# Patient Record
Sex: FEMALE | Race: WHITE | NOT HISPANIC OR LATINO | Employment: OTHER | URBAN - METROPOLITAN AREA
[De-identification: names, ages, dates, MRNs, and addresses within clinical notes are randomized per-mention and may not be internally consistent; named-entity substitution may affect disease eponyms.]

---

## 2018-02-08 ENCOUNTER — HOSPITAL ENCOUNTER (OUTPATIENT)
Dept: OTHER | Facility: HOSPITAL | Age: 83
Discharge: HOME | End: 2018-02-08
Attending: OPHTHALMOLOGY | Admitting: OPHTHALMOLOGY

## 2018-02-08 LAB
ANION GAP SERPL CALC-SCNC: 8.9 MMOL/L (ref 10–20)
BUN SERPL-MCNC: 15 MG/DL (ref 8–20)
BUN/CREAT SERPL: 30 (ref 5.4–26.2)
CALCIUM SERPL-MCNC: 8.6 MG/DL (ref 8.9–10.3)
CHLORIDE SERPL-SCNC: 102 MMOL/L (ref 101–111)
CONV CO2: 26 MMOL/L (ref 22–32)
CREAT UR-MCNC: 0.5 MG/DL (ref 0.4–1)
GLUCOSE SERPL-MCNC: 141 MG/DL (ref 65–99)
POTASSIUM SERPL-SCNC: 3.9 MMOL/L (ref 3.6–5.1)
SODIUM SERPL-SCNC: 133 MMOL/L (ref 136–144)

## 2018-02-26 ENCOUNTER — HOSPITAL ENCOUNTER (OUTPATIENT)
Dept: CARDIOLOGY | Facility: HOSPITAL | Age: 83
Discharge: HOME | End: 2018-02-26
Attending: INTERNAL MEDICINE | Admitting: INTERNAL MEDICINE

## 2018-04-17 ENCOUNTER — HOSPITAL ENCOUNTER (OUTPATIENT)
Dept: CARDIOLOGY | Facility: HOSPITAL | Age: 83
Discharge: HOME | End: 2018-04-17
Attending: PODIATRIST | Admitting: PODIATRIST

## 2019-01-04 ENCOUNTER — HOSPITAL ENCOUNTER (OUTPATIENT)
Dept: LAB | Facility: HOSPITAL | Age: 84
Discharge: HOME | End: 2019-01-04
Attending: FAMILY MEDICINE | Admitting: FAMILY MEDICINE

## 2019-01-04 LAB
ALBUMIN SERPL-MCNC: 4 G/DL (ref 3.5–4.8)
ALBUMIN/GLOB SERPL: 1.6 {RATIO} (ref 1–1.7)
ALP SERPL-CCNC: 83 IU/L (ref 32–91)
ALT SERPL-CCNC: 16 IU/L (ref 14–54)
ANION GAP SERPL CALC-SCNC: 12.5 MMOL/L (ref 10–20)
AST SERPL-CCNC: 21 IU/L (ref 15–41)
BASOPHILS # BLD AUTO: 0 10*3/UL (ref 0–0.2)
BASOPHILS NFR BLD AUTO: 1 % (ref 0–2)
BILIRUB SERPL-MCNC: 0.3 MG/DL (ref 0.3–1.2)
BUN SERPL-MCNC: 16 MG/DL (ref 8–20)
BUN/CREAT SERPL: 32 (ref 5.4–26.2)
CALCIUM SERPL-MCNC: 8.7 MG/DL (ref 8.9–10.3)
CHLORIDE SERPL-SCNC: 97 MMOL/L (ref 101–111)
CONV CO2: 26 MMOL/L (ref 22–32)
CONV TOTAL PROTEIN: 6.5 G/DL (ref 6.1–7.9)
CREAT UR-MCNC: 0.5 MG/DL (ref 0.4–1)
DIFFERENTIAL METHOD BLD: (no result)
EOSINOPHIL # BLD AUTO: 0.1 10*3/UL (ref 0–0.3)
EOSINOPHIL # BLD AUTO: 1 % (ref 0–3)
ERYTHROCYTE [DISTWIDTH] IN BLOOD BY AUTOMATED COUNT: 13.4 % (ref 11.5–14.5)
GLOBULIN UR ELPH-MCNC: 2.5 G/DL (ref 2.5–3.8)
GLUCOSE SERPL-MCNC: 105 MG/DL (ref 65–99)
HCT VFR BLD AUTO: 37.5 % (ref 35–49)
HGB BLD-MCNC: 12.6 G/DL (ref 12–15)
LYMPHOCYTES # BLD AUTO: 1.5 10*3/UL (ref 0.8–4.8)
LYMPHOCYTES NFR BLD AUTO: 37 % (ref 18–42)
MCH RBC QN AUTO: 34.4 PG (ref 26–32)
MCHC RBC AUTO-ENTMCNC: 33.5 G/DL (ref 32–36)
MCV RBC AUTO: 102.5 FL (ref 80–94)
MONOCYTES # BLD AUTO: 0.4 10*3/UL (ref 0.1–1.3)
MONOCYTES NFR BLD AUTO: 11 % (ref 2–11)
NEUTROPHILS # BLD AUTO: 2.1 10*3/UL (ref 2.3–8.6)
NEUTROPHILS NFR BLD AUTO: 50 % (ref 50–75)
NRBC BLD AUTO-RTO: 0 /100{WBCS}
NRBC/RBC NFR BLD MANUAL: 0 10*3/UL
PLATELET # BLD AUTO: 210 10*3/UL (ref 150–450)
PMV BLD AUTO: 8.4 FL (ref 7.4–10.4)
POTASSIUM SERPL-SCNC: 4.5 MMOL/L (ref 3.6–5.1)
RBC # BLD AUTO: 3.66 10*6/UL (ref 4–5.4)
SODIUM SERPL-SCNC: 131 MMOL/L (ref 136–144)
WBC # BLD AUTO: 4.2 10*3/UL (ref 4.5–11.5)

## 2019-11-08 ENCOUNTER — HOSPITAL ENCOUNTER (INPATIENT)
Facility: HOSPITAL | Age: 84
LOS: 3 days | Discharge: NURSING, REHAB OR LONG-TERM CARE | End: 2019-11-13
Attending: FAMILY MEDICINE | Admitting: FAMILY MEDICINE

## 2019-11-08 ENCOUNTER — APPOINTMENT (OUTPATIENT)
Dept: CT IMAGING | Facility: HOSPITAL | Age: 84
End: 2019-11-08

## 2019-11-08 ENCOUNTER — APPOINTMENT (OUTPATIENT)
Dept: GENERAL RADIOLOGY | Facility: HOSPITAL | Age: 84
End: 2019-11-08

## 2019-11-08 DIAGNOSIS — R47.81 SLURRED SPEECH: ICD-10-CM

## 2019-11-08 DIAGNOSIS — E87.6 HYPOKALEMIA: ICD-10-CM
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
## 2019-11-08 DIAGNOSIS — R53.1 WEAKNESS: Primary | ICD-10-CM

## 2019-11-08 LAB
ALBUMIN SERPL-MCNC: 4.1 G/DL (ref 3.5–5.2)
ALBUMIN/GLOB SERPL: 1.4 G/DL
ALP SERPL-CCNC: 47 U/L (ref 39–117)
ALT SERPL W P-5'-P-CCNC: 20 U/L (ref 1–33)
ANION GAP SERPL CALCULATED.3IONS-SCNC: 12 MMOL/L (ref 5–15)
AST SERPL-CCNC: 37 U/L (ref 1–32)
BACTERIA UR QL AUTO: ABNORMAL /HPF
BASOPHILS # BLD AUTO: 0 10*3/MM3 (ref 0–0.2)
BASOPHILS NFR BLD AUTO: 0.3 % (ref 0–1.5)
BILIRUB SERPL-MCNC: <0.2 MG/DL (ref 0.2–1.2)
BILIRUB UR QL STRIP: ABNORMAL
BUN BLD-MCNC: 33 MG/DL (ref 8–23)
BUN/CREAT SERPL: 40.7 (ref 7–25)
CALCIUM SPEC-SCNC: 9.2 MG/DL (ref 8.6–10.5)
CHLORIDE SERPL-SCNC: 102 MMOL/L (ref 98–107)
CLARITY UR: CLEAR
CO2 SERPL-SCNC: 26 MMOL/L (ref 22–29)
COLOR UR: ABNORMAL
CREAT BLD-MCNC: 0.81 MG/DL (ref 0.57–1)
DEPRECATED RDW RBC AUTO: 50.8 FL (ref 37–54)
EOSINOPHIL # BLD AUTO: 0.1 10*3/MM3 (ref 0–0.4)
EOSINOPHIL NFR BLD AUTO: 0.7 % (ref 0.3–6.2)
ERYTHROCYTE [DISTWIDTH] IN BLOOD BY AUTOMATED COUNT: 14.1 % (ref 12.3–15.4)
GFR SERPL CREATININE-BSD FRML MDRD: 67 ML/MIN/1.73
GLOBULIN UR ELPH-MCNC: 2.9 GM/DL
GLUCOSE BLD-MCNC: 128 MG/DL (ref 65–99)
GLUCOSE UR STRIP-MCNC: NEGATIVE MG/DL
HCT VFR BLD AUTO: 33.1 % (ref 34–46.6)
HGB BLD-MCNC: 11.5 G/DL (ref 12–15.9)
HGB UR QL STRIP.AUTO: NEGATIVE
HOLD SPECIMEN: NORMAL
HOLD SPECIMEN: NORMAL
HYALINE CASTS UR QL AUTO: ABNORMAL /LPF
KETONES UR QL STRIP: ABNORMAL
LEUKOCYTE ESTERASE UR QL STRIP.AUTO: NEGATIVE
LYMPHOCYTES # BLD AUTO: 0.8 10*3/MM3 (ref 0.7–3.1)
LYMPHOCYTES NFR BLD AUTO: 9.6 % (ref 19.6–45.3)
MCH RBC QN AUTO: 36 PG (ref 26.6–33)
MCHC RBC AUTO-ENTMCNC: 34.8 G/DL (ref 31.5–35.7)
MCV RBC AUTO: 103.5 FL (ref 79–97)
MONOCYTES # BLD AUTO: 0.5 10*3/MM3 (ref 0.1–0.9)
MONOCYTES NFR BLD AUTO: 6.3 % (ref 5–12)
NEUTROPHILS # BLD AUTO: 6.9 10*3/MM3 (ref 1.7–7)
NEUTROPHILS NFR BLD AUTO: 83.1 % (ref 42.7–76)
NITRITE UR QL STRIP: NEGATIVE
NRBC BLD AUTO-RTO: 0.1 /100 WBC (ref 0–0.2)
PH UR STRIP.AUTO: <=5 [PH] (ref 5–8)
PLATELET # BLD AUTO: 192 10*3/MM3 (ref 140–450)
PMV BLD AUTO: 8.5 FL (ref 6–12)
POTASSIUM BLD-SCNC: 3.3 MMOL/L (ref 3.5–5.2)
PROT SERPL-MCNC: 7 G/DL (ref 6–8.5)
PROT UR QL STRIP: ABNORMAL
RBC # BLD AUTO: 3.19 10*6/MM3 (ref 3.77–5.28)
RBC # UR: ABNORMAL /HPF
REF LAB TEST METHOD: ABNORMAL
SODIUM BLD-SCNC: 140 MMOL/L (ref 136–145)
SP GR UR STRIP: 1.03 (ref 1–1.03)
SQUAMOUS #/AREA URNS HPF: ABNORMAL /HPF
UROBILINOGEN UR QL STRIP: ABNORMAL
WBC NRBC COR # BLD: 8.3 10*3/MM3 (ref 3.4–10.8)
WBC UR QL AUTO: ABNORMAL /HPF
WHOLE BLOOD HOLD SPECIMEN: NORMAL
WHOLE BLOOD HOLD SPECIMEN: NORMAL

## 2019-11-08 PROCEDURE — 93005 ELECTROCARDIOGRAM TRACING: CPT

## 2019-11-08 PROCEDURE — 85025 COMPLETE CBC W/AUTO DIFF WBC: CPT | Performed by: NURSE PRACTITIONER

## 2019-11-08 PROCEDURE — P9612 CATHETERIZE FOR URINE SPEC: HCPCS

## 2019-11-08 PROCEDURE — 81001 URINALYSIS AUTO W/SCOPE: CPT | Performed by: NURSE PRACTITIONER

## 2019-11-08 PROCEDURE — 25810000003 SODIUM CHLORIDE 0.9 % WITH KCL 20 MEQ 20-0.9 MEQ/L-% SOLUTION: Performed by: FAMILY MEDICINE

## 2019-11-08 PROCEDURE — 71045 X-RAY EXAM CHEST 1 VIEW: CPT

## 2019-11-08 PROCEDURE — 99284 EMERGENCY DEPT VISIT MOD MDM: CPT

## 2019-11-08 PROCEDURE — G0378 HOSPITAL OBSERVATION PER HR: HCPCS

## 2019-11-08 PROCEDURE — 93005 ELECTROCARDIOGRAM TRACING: CPT | Performed by: FAMILY MEDICINE

## 2019-11-08 PROCEDURE — 70450 CT HEAD/BRAIN W/O DYE: CPT

## 2019-11-08 PROCEDURE — 82962 GLUCOSE BLOOD TEST: CPT

## 2019-11-08 PROCEDURE — 80053 COMPREHEN METABOLIC PANEL: CPT | Performed by: NURSE PRACTITIONER

## 2019-11-08 RX ORDER — SODIUM CHLORIDE AND POTASSIUM CHLORIDE 150; 900 MG/100ML; MG/100ML
75 INJECTION, SOLUTION INTRAVENOUS CONTINUOUS
Status: DISCONTINUED | OUTPATIENT
Start: 2019-11-08 | End: 2019-11-09

## 2019-11-08 RX ORDER — ZONISAMIDE 100 MG/1
100 CAPSULE ORAL 4 TIMES DAILY
COMMUNITY
End: 2022-11-07

## 2019-11-08 RX ORDER — CARBAMAZEPINE 200 MG/1
200 TABLET ORAL 4 TIMES DAILY
COMMUNITY

## 2019-11-08 RX ORDER — NAPROXEN 375 MG/1
375 TABLET ORAL 2 TIMES DAILY WITH MEALS
Status: DISCONTINUED | OUTPATIENT
Start: 2019-11-08 | End: 2019-11-08 | Stop reason: CLARIF

## 2019-11-08 RX ORDER — HYDRALAZINE HYDROCHLORIDE 25 MG/1
100 TABLET, FILM COATED ORAL EVERY 12 HOURS SCHEDULED
Status: DISCONTINUED | OUTPATIENT
Start: 2019-11-08 | End: 2019-11-13 | Stop reason: HOSPADM

## 2019-11-08 RX ORDER — SODIUM CHLORIDE 0.9 % (FLUSH) 0.9 %
10 SYRINGE (ML) INJECTION EVERY 12 HOURS SCHEDULED
Status: DISCONTINUED | OUTPATIENT
Start: 2019-11-08 | End: 2019-11-13 | Stop reason: HOSPADM

## 2019-11-08 RX ORDER — PANTOPRAZOLE SODIUM 40 MG/1
40 TABLET, DELAYED RELEASE ORAL EVERY MORNING
Status: DISCONTINUED | OUTPATIENT
Start: 2019-11-09 | End: 2019-11-13 | Stop reason: HOSPADM

## 2019-11-08 RX ORDER — HYDRALAZINE HYDROCHLORIDE 100 MG/1
100 TABLET, FILM COATED ORAL 2 TIMES DAILY
COMMUNITY

## 2019-11-08 RX ORDER — GABAPENTIN 300 MG/1
300 CAPSULE ORAL 3 TIMES DAILY
Status: ON HOLD | COMMUNITY
End: 2019-11-12 | Stop reason: SDUPTHER

## 2019-11-08 RX ORDER — POTASSIUM CHLORIDE 20 MEQ/1
20 TABLET, EXTENDED RELEASE ORAL DAILY
Status: DISCONTINUED | OUTPATIENT
Start: 2019-11-08 | End: 2019-11-13 | Stop reason: HOSPADM

## 2019-11-08 RX ORDER — SODIUM CHLORIDE 0.9 % (FLUSH) 0.9 %
10 SYRINGE (ML) INJECTION AS NEEDED
Status: DISCONTINUED | OUTPATIENT
Start: 2019-11-08 | End: 2019-11-13 | Stop reason: HOSPADM

## 2019-11-08 RX ORDER — CARBAMAZEPINE 200 MG/1
200 TABLET ORAL EVERY 6 HOURS SCHEDULED
Status: DISCONTINUED | OUTPATIENT
Start: 2019-11-09 | End: 2019-11-12

## 2019-11-08 RX ORDER — ZONISAMIDE 100 MG/1
100 CAPSULE ORAL EVERY 6 HOURS SCHEDULED
Status: DISCONTINUED | OUTPATIENT
Start: 2019-11-09 | End: 2019-11-12

## 2019-11-08 RX ORDER — OMEPRAZOLE 20 MG/1
20 CAPSULE, DELAYED RELEASE ORAL DAILY
COMMUNITY

## 2019-11-08 RX ORDER — DIMENHYDRINATE 50 MG
1 TABLET ORAL DAILY
COMMUNITY
End: 2019-11-13 | Stop reason: HOSPADM

## 2019-11-08 RX ORDER — IBUPROFEN 400 MG/1
800 TABLET ORAL EVERY 8 HOURS SCHEDULED
Status: DISCONTINUED | OUTPATIENT
Start: 2019-11-08 | End: 2019-11-13 | Stop reason: HOSPADM

## 2019-11-08 RX ORDER — GABAPENTIN 300 MG/1
300 CAPSULE ORAL EVERY 8 HOURS SCHEDULED
Status: DISCONTINUED | OUTPATIENT
Start: 2019-11-08 | End: 2019-11-09

## 2019-11-08 RX ADMIN — POTASSIUM CHLORIDE 20 MEQ: 1500 TABLET, EXTENDED RELEASE ORAL at 17:42

## 2019-11-08 RX ADMIN — HYDRALAZINE HYDROCHLORIDE 100 MG: 25 TABLET, FILM COATED ORAL at 22:09

## 2019-11-08 RX ADMIN — Medication 10 ML: at 16:29

## 2019-11-08 RX ADMIN — IBUPROFEN 800 MG: 400 TABLET ORAL at 22:13

## 2019-11-08 RX ADMIN — GABAPENTIN 300 MG: 300 CAPSULE ORAL at 22:09

## 2019-11-08 RX ADMIN — SODIUM CHLORIDE AND POTASSIUM CHLORIDE 75 ML/HR: 9; 1.49 INJECTION, SOLUTION INTRAVENOUS at 22:11

## 2019-11-08 NOTE — ED PROVIDER NOTES
Subjective   87-year-old female presents with a one-week history of intermittent slurred speech, weakness of the left lower extremity.  Family at bedside reports that she lives alone and that they check in on her daily.  When patient had initial episode 1 week ago she followed up with her PCP, Dr. Yessy Spears.  Her son reported that she was found on the floor but denies any injury.  Patient is alert to self and family at bedside, not time.    1. Location: Denies pain  2. Quality: None  3. Severity: 0  4. Worsening factors: Denies  5. Alleviating factors: Denies  6. Onset: 1 week  7. Radiation: None  8. Frequency: Intermittent  9. Co-morbidities: Dementia, GERD, hypertension, and immobility disorder  10. Source: Patient and family at bedside              Review of Systems   Constitutional: Negative for chills, diaphoresis and fever.   HENT: Negative for ear discharge and rhinorrhea.    Eyes: Negative for photophobia, pain and visual disturbance.   Respiratory: Negative for chest tightness and shortness of breath.    Cardiovascular: Negative for chest pain, palpitations and leg swelling.   Gastrointestinal: Negative for abdominal pain, nausea and vomiting.   Genitourinary: Negative for difficulty urinating and dysuria.   Musculoskeletal: Negative for gait problem.   Skin: Negative for color change, pallor and rash.   Neurological: Positive for weakness. Negative for dizziness, speech difficulty, numbness and headaches.   Psychiatric/Behavioral: Positive for confusion. Negative for agitation.   All other systems reviewed and are negative.      No past medical history on file.    Allergies   Allergen Reactions   • Banana Itching       No past surgical history on file.    No family history on file.    Social History     Socioeconomic History   • Marital status:      Spouse name: Not on file   • Number of children: Not on file   • Years of education: Not on file   • Highest education level: Not on file            Objective   Physical Exam   Constitutional: She is oriented to person, place, and time. She appears well-developed and well-nourished. No distress.   HENT:   Head: Normocephalic and atraumatic.   Right Ear: External ear normal.   Left Ear: External ear normal.   Nose: Nose normal.   Mouth/Throat: Oropharynx is clear and moist.   Eyes: Conjunctivae and EOM are normal. Pupils are equal, round, and reactive to light.   Neck: Trachea normal, normal range of motion, full passive range of motion without pain and phonation normal. Neck supple. No spinous process tenderness present. No neck rigidity. No erythema and normal range of motion present. No Brudzinski's sign and no Kernig's sign noted.   Cardiovascular: Normal rate, regular rhythm, S1 normal, S2 normal, normal heart sounds, intact distal pulses and normal pulses. Exam reveals no gallop and no friction rub.   No murmur heard.  Pulmonary/Chest: Effort normal and breath sounds normal. No stridor. No respiratory distress. She has no wheezes. She has no rales. She exhibits no tenderness.   Abdominal: Soft. Bowel sounds are normal. She exhibits no distension and no mass. There is no tenderness. There is no rebound and no guarding. No hernia.   Musculoskeletal: Normal range of motion.   Neurological: She is alert and oriented to person, place, and time. She has normal strength. No cranial nerve deficit or sensory deficit. She exhibits normal muscle tone. GCS eye subscore is 4. GCS verbal subscore is 5. GCS motor subscore is 6.   Nothing focal noted on exam.   Skin: Skin is warm and dry. Capillary refill takes less than 2 seconds.   Psychiatric: She has a normal mood and affect. Her behavior is normal. Judgment and thought content normal.   Nursing note and vitals reviewed.      Procedures           ED Course  ED Course as of Nov 08 1738 Fri Nov 08, 2019   1636 Awaiting labs and CT result.  [AL]   1707 Noted to be 12.5 10 months ago. Hemoglobin: (!) 11.5 [AL]       ED Course User Index  [AL] Ariana Baires, NP       Ct Head Without Contrast    Result Date: 11/8/2019   1. No acute intracranial finding. 2. FINDINGS consistent with mild chronic microvascular disease and atrophy.  Electronically Signed By-Dr. Zayra Mena MD On:11/8/2019 4:40 PM This report was finalized on [id number] by Dr. Zayra Mena MD.    Xr Chest 1 View    Result Date: 11/8/2019  1. Cardiomegaly with mild bilateral perihilar airspace disease which may relate to pulmonary edema or pneumonia. 2. Small left pleural effusion.  Electronically Signed By-Rolly Lin On:11/8/2019 4:24 PM This report was finalized on [id number] by  Rolly Lin, .    Medications   sodium chloride 0.9 % flush 10 mL (10 mL Intravenous Given 11/8/19 1629)   potassium chloride (K-DUR,KLOR-CON) CR tablet 20 mEq (not administered)     Labs Reviewed   COMPREHENSIVE METABOLIC PANEL - Abnormal; Notable for the following components:       Result Value    Glucose 128 (*)     BUN 33 (*)     Potassium 3.3 (*)     AST (SGOT) 37 (*)     Total Bilirubin <0.2 (*)     BUN/Creatinine Ratio 40.7 (*)     All other components within normal limits    Narrative:     GFR Normal >60  Chronic Kidney Disease <60  Kidney Failure <15   URINALYSIS W/ MICROSCOPIC IF INDICATED (NO CULTURE) - Abnormal; Notable for the following components:    Color, UA Dark Yellow (*)     Ketones, UA Trace (*)     Bilirubin, UA Small (1+) (*)     Protein, UA 30 mg/dL (1+) (*)     All other components within normal limits   CBC WITH AUTO DIFFERENTIAL - Abnormal; Notable for the following components:    RBC 3.19 (*)     Hemoglobin 11.5 (*)     Hematocrit 33.1 (*)     .5 (*)     MCH 36.0 (*)     Neutrophil % 83.1 (*)     Lymphocyte % 9.6 (*)     All other components within normal limits   URINALYSIS, MICROSCOPIC ONLY - Abnormal; Notable for the following components:    Bacteria, UA Trace (*)     All other components within normal limits   RAINBOW DRAW     Narrative:     The following orders were created for panel order [place] Draw.  Procedure                               Abnormality         Status                     ---------                               -----------         ------                     Light Blue Top[[id number]]                                   Final result               Green Top (Gel)[[id number]]                                  Final result               Lavender Top[[id number]]                                     Final result               Gold Top - SST[[id number]]                                   Final result                 Please view results for these tests on the individual orders.   POCT GLUCOSE FINGERSTICK   CBC AND DIFFERENTIAL    Narrative:     The following orders were created for panel order CBC & Differential.  Procedure                               Abnormality         Status                     ---------                               -----------         ------                     CBC Auto Differential[[id number]]        Abnormal            Final result                 Please view results for these tests on the individual orders.   LIGHT BLUE TOP   GREEN TOP   LAVENDER TOP   GOLD TOP - SST                 MDM  Number of Diagnoses or Management Options  Fall, initial encounter:   Hypokalemia:   Slurred speech:   Weakness:   Diagnosis management comments: Chart Review: Nothing for comparison.  Comorbidity: Dementia, GERD, hypertension, and immobility disorder  Imaging: Was interpreted by physician and reviewed by myself: Xr Chest 1 View    Result Date: 11/8/2019  1. Cardiomegaly with mild bilateral perihilar airspace disease which may relate to pulmonary edema or pneumonia. 2. Small left pleural effusion.  Electronically Signed By-Rolly Lin On:11/8/2019 4:24 PM This report was finalized on [id number] by  Rolly Lin, .  Disposition/Treatment: Discussed results with patient, verbalized understanding.  Agreeable with plan of  care.    Patient undressed and placed in gown for exam. 87-year-old female presents with a one-week history of intermittent slurred speech, weakness of the left lower extremity.  Family at bedside reports that she lives alone and that they check in on her daily.  When patient had initial episode 1 week ago she followed up with her PCP, Dr. Yessy Spears.  Her son reported that she was found on the floor but denies any injury 10/26/19.  Patient is alert to self and family at bedside.  Chest x-ray and head CT both negative.  Paged Dr. Spears who accepted admission.         Amount and/or Complexity of Data Reviewed  Clinical lab tests: reviewed  Tests in the radiology section of CPT®: reviewed  Tests in the medicine section of CPT®: reviewed        Final diagnoses:   Weakness   Slurred speech   Fall, initial encounter   Hypokalemia              Ariana Baires, NP  11/08/19 9774

## 2019-11-08 NOTE — ED NOTES
Dtr's at bedside state pt lives alone but family checks on her everyday. They state she has been lethargic, weak, uncoordinated, and slurring her speech since about October 26th or 27th. Pt son found pt on the floor on that day as well, stating she did not hit her head or have LOC that day. She was seen at doctor on October 29th and s/sx were addressed with PCP Dr. eYssy Spears. They ordered blood work & urine and was told everything except her elevated cholesterol looked normal. S/sx improved for a couple days and then today her slurred speech, uncoordinated movements, and weakness with c/o left leg unable to move today only. Dtr states pt stated she went down to the floor so she didn't fall and hurt herself yesterday, and did have an episode of vomiting yesterday. Pt is A&O to self and place only. Disoriented to time and situation, with a GCS of 13. Pt denies any pain.     Kristina Rocha RN  11/08/19 5452

## 2019-11-09 ENCOUNTER — APPOINTMENT (OUTPATIENT)
Dept: MRI IMAGING | Facility: HOSPITAL | Age: 84
End: 2019-11-09

## 2019-11-09 ENCOUNTER — HOSPITAL ENCOUNTER (OUTPATIENT)
Dept: CARDIOLOGY | Facility: HOSPITAL | Age: 84
Setting detail: OBSERVATION
Discharge: HOME | End: 2019-11-09

## 2019-11-09 VITALS
BODY MASS INDEX: 22.58 KG/M2 | WEIGHT: 115 LBS | DIASTOLIC BLOOD PRESSURE: 44 MMHG | HEIGHT: 60 IN | SYSTOLIC BLOOD PRESSURE: 115 MMHG

## 2019-11-09 LAB
ALBUMIN SERPL-MCNC: 3.4 G/DL (ref 3.5–5.2)
ALBUMIN/GLOB SERPL: 1.4 G/DL
ALP SERPL-CCNC: 43 U/L (ref 39–117)
ALT SERPL W P-5'-P-CCNC: 15 U/L (ref 1–33)
ANION GAP SERPL CALCULATED.3IONS-SCNC: 12 MMOL/L (ref 5–15)
AST SERPL-CCNC: 29 U/L (ref 1–32)
BASOPHILS # BLD AUTO: 0 10*3/MM3 (ref 0–0.2)
BASOPHILS NFR BLD AUTO: 0.5 % (ref 0–1.5)
BILIRUB SERPL-MCNC: 0.2 MG/DL (ref 0.2–1.2)
BUN BLD-MCNC: 28 MG/DL (ref 8–23)
BUN/CREAT SERPL: 39.4 (ref 7–25)
CALCIUM SPEC-SCNC: 8.4 MG/DL (ref 8.6–10.5)
CHLORIDE SERPL-SCNC: 105 MMOL/L (ref 98–107)
CHOLEST SERPL-MCNC: 300 MG/DL (ref 0–200)
CO2 SERPL-SCNC: 24 MMOL/L (ref 22–29)
CREAT BLD-MCNC: 0.71 MG/DL (ref 0.57–1)
DEPRECATED RDW RBC AUTO: 51.2 FL (ref 37–54)
EOSINOPHIL # BLD AUTO: 0.2 10*3/MM3 (ref 0–0.4)
EOSINOPHIL NFR BLD AUTO: 2.7 % (ref 0.3–6.2)
ERYTHROCYTE [DISTWIDTH] IN BLOOD BY AUTOMATED COUNT: 14 % (ref 12.3–15.4)
GFR SERPL CREATININE-BSD FRML MDRD: 78 ML/MIN/1.73
GLOBULIN UR ELPH-MCNC: 2.5 GM/DL
GLUCOSE BLD-MCNC: 90 MG/DL (ref 65–99)
HCT VFR BLD AUTO: 28.6 % (ref 34–46.6)
HDLC SERPL-MCNC: 71 MG/DL (ref 40–60)
HEMOCCULT STL QL IA: NEGATIVE
HGB BLD-MCNC: 9.7 G/DL (ref 12–15.9)
LDLC SERPL CALC-MCNC: 199 MG/DL (ref 0–100)
LDLC/HDLC SERPL: 2.81 {RATIO}
LYMPHOCYTES # BLD AUTO: 1.1 10*3/MM3 (ref 0.7–3.1)
LYMPHOCYTES NFR BLD AUTO: 18.4 % (ref 19.6–45.3)
MCH RBC QN AUTO: 35.3 PG (ref 26.6–33)
MCHC RBC AUTO-ENTMCNC: 34 G/DL (ref 31.5–35.7)
MCV RBC AUTO: 103.9 FL (ref 79–97)
MONOCYTES # BLD AUTO: 0.6 10*3/MM3 (ref 0.1–0.9)
MONOCYTES NFR BLD AUTO: 10.1 % (ref 5–12)
NEUTROPHILS # BLD AUTO: 4.2 10*3/MM3 (ref 1.7–7)
NEUTROPHILS NFR BLD AUTO: 68.3 % (ref 42.7–76)
NRBC BLD AUTO-RTO: 0 /100 WBC (ref 0–0.2)
PLATELET # BLD AUTO: 160 10*3/MM3 (ref 140–450)
PMV BLD AUTO: 8.5 FL (ref 6–12)
POTASSIUM BLD-SCNC: 3.5 MMOL/L (ref 3.5–5.2)
PROT SERPL-MCNC: 5.9 G/DL (ref 6–8.5)
RBC # BLD AUTO: 2.75 10*6/MM3 (ref 3.77–5.28)
SODIUM BLD-SCNC: 141 MMOL/L (ref 136–145)
TRIGL SERPL-MCNC: 148 MG/DL (ref 0–150)
TSH SERPL DL<=0.05 MIU/L-ACNC: 1.09 UIU/ML (ref 0.27–4.2)
VIT B12 BLD-MCNC: 286 PG/ML (ref 211–946)
VLDLC SERPL-MCNC: 29.6 MG/DL
WBC NRBC COR # BLD: 6.2 10*3/MM3 (ref 3.4–10.8)

## 2019-11-09 PROCEDURE — 25810000003 SODIUM CHLORIDE 0.9 % WITH KCL 20 MEQ 20-0.9 MEQ/L-% SOLUTION: Performed by: FAMILY MEDICINE

## 2019-11-09 PROCEDURE — 97116 GAIT TRAINING THERAPY: CPT

## 2019-11-09 PROCEDURE — 97166 OT EVAL MOD COMPLEX 45 MIN: CPT

## 2019-11-09 PROCEDURE — 70549 MR ANGIOGRAPH NECK W/O&W/DYE: CPT

## 2019-11-09 PROCEDURE — 80157 ASSAY CARBAMAZEPINE FREE: CPT | Performed by: NURSE PRACTITIONER

## 2019-11-09 PROCEDURE — 83036 HEMOGLOBIN GLYCOSYLATED A1C: CPT | Performed by: FAMILY MEDICINE

## 2019-11-09 PROCEDURE — 70544 MR ANGIOGRAPHY HEAD W/O DYE: CPT

## 2019-11-09 PROCEDURE — 25010000002 GADOTERIDOL PER 1 ML: Performed by: FAMILY MEDICINE

## 2019-11-09 PROCEDURE — 80053 COMPREHEN METABOLIC PANEL: CPT | Performed by: FAMILY MEDICINE

## 2019-11-09 PROCEDURE — 84443 ASSAY THYROID STIM HORMONE: CPT | Performed by: FAMILY MEDICINE

## 2019-11-09 PROCEDURE — 80203 DRUG SCREEN QUANT ZONISAMIDE: CPT | Performed by: NURSE PRACTITIONER

## 2019-11-09 PROCEDURE — 97163 PT EVAL HIGH COMPLEX 45 MIN: CPT

## 2019-11-09 PROCEDURE — G0378 HOSPITAL OBSERVATION PER HR: HCPCS

## 2019-11-09 PROCEDURE — 80061 LIPID PANEL: CPT | Performed by: FAMILY MEDICINE

## 2019-11-09 PROCEDURE — 85025 COMPLETE CBC W/AUTO DIFF WBC: CPT | Performed by: FAMILY MEDICINE

## 2019-11-09 PROCEDURE — 82274 ASSAY TEST FOR BLOOD FECAL: CPT | Performed by: FAMILY MEDICINE

## 2019-11-09 PROCEDURE — 25010000002 CEFTRIAXONE PER 250 MG: Performed by: NURSE PRACTITIONER

## 2019-11-09 PROCEDURE — 80156 ASSAY CARBAMAZEPINE TOTAL: CPT | Performed by: NURSE PRACTITIONER

## 2019-11-09 PROCEDURE — 93306 TTE W/DOPPLER COMPLETE: CPT

## 2019-11-09 PROCEDURE — 82607 VITAMIN B-12: CPT | Performed by: FAMILY MEDICINE

## 2019-11-09 PROCEDURE — 70551 MRI BRAIN STEM W/O DYE: CPT

## 2019-11-09 PROCEDURE — A9576 INJ PROHANCE MULTIPACK: HCPCS | Performed by: FAMILY MEDICINE

## 2019-11-09 RX ORDER — GABAPENTIN 100 MG/1
100 CAPSULE ORAL EVERY 8 HOURS SCHEDULED
Status: DISCONTINUED | OUTPATIENT
Start: 2019-11-09 | End: 2019-11-11

## 2019-11-09 RX ORDER — POTASSIUM CHLORIDE 20 MEQ/1
20 TABLET, EXTENDED RELEASE ORAL DAILY
Status: DISCONTINUED | OUTPATIENT
Start: 2019-11-09 | End: 2019-11-09 | Stop reason: SDUPTHER

## 2019-11-09 RX ORDER — ATORVASTATIN CALCIUM 20 MG/1
20 TABLET, FILM COATED ORAL NIGHTLY
Status: DISCONTINUED | OUTPATIENT
Start: 2019-11-09 | End: 2019-11-13 | Stop reason: HOSPADM

## 2019-11-09 RX ADMIN — HYDRALAZINE HYDROCHLORIDE 100 MG: 25 TABLET, FILM COATED ORAL at 11:09

## 2019-11-09 RX ADMIN — Medication 10 ML: at 21:21

## 2019-11-09 RX ADMIN — GABAPENTIN 300 MG: 300 CAPSULE ORAL at 06:31

## 2019-11-09 RX ADMIN — ZONISAMIDE 100 MG: 100 CAPSULE ORAL at 17:17

## 2019-11-09 RX ADMIN — CARBAMAZEPINE 200 MG: 200 TABLET ORAL at 17:17

## 2019-11-09 RX ADMIN — PANTOPRAZOLE SODIUM 40 MG: 40 TABLET, DELAYED RELEASE ORAL at 06:31

## 2019-11-09 RX ADMIN — IBUPROFEN 800 MG: 400 TABLET ORAL at 06:31

## 2019-11-09 RX ADMIN — IBUPROFEN 800 MG: 400 TABLET ORAL at 21:20

## 2019-11-09 RX ADMIN — ATORVASTATIN CALCIUM 20 MG: 20 TABLET, FILM COATED ORAL at 21:20

## 2019-11-09 RX ADMIN — CEFTRIAXONE SODIUM 1 G: 1 INJECTION, POWDER, FOR SOLUTION INTRAMUSCULAR; INTRAVENOUS at 16:39

## 2019-11-09 RX ADMIN — HYDRALAZINE HYDROCHLORIDE 100 MG: 25 TABLET, FILM COATED ORAL at 21:20

## 2019-11-09 RX ADMIN — IBUPROFEN 800 MG: 400 TABLET ORAL at 13:37

## 2019-11-09 RX ADMIN — GADOTERIDOL 20 ML: 279.3 INJECTION, SOLUTION INTRAVENOUS at 12:30

## 2019-11-09 RX ADMIN — GABAPENTIN 100 MG: 100 CAPSULE ORAL at 13:36

## 2019-11-09 RX ADMIN — CARBAMAZEPINE 200 MG: 200 TABLET ORAL at 00:09

## 2019-11-09 RX ADMIN — CARBAMAZEPINE 200 MG: 200 TABLET ORAL at 11:09

## 2019-11-09 RX ADMIN — GABAPENTIN 100 MG: 100 CAPSULE ORAL at 21:20

## 2019-11-09 RX ADMIN — CARBAMAZEPINE 200 MG: 200 TABLET ORAL at 06:31

## 2019-11-09 RX ADMIN — POTASSIUM CHLORIDE 20 MEQ: 1500 TABLET, EXTENDED RELEASE ORAL at 11:09

## 2019-11-09 RX ADMIN — Medication 10 ML: at 11:10

## 2019-11-09 RX ADMIN — Medication 10 ML: at 00:11

## 2019-11-09 RX ADMIN — SODIUM CHLORIDE AND POTASSIUM CHLORIDE 75 ML/HR: 9; 1.49 INJECTION, SOLUTION INTRAVENOUS at 13:25

## 2019-11-09 NOTE — PLAN OF CARE
Problem: Patient Care Overview  Goal: Plan of Care Review   11/09/19 2345   Coping/Psychosocial   Plan of Care Reviewed With patient;daughter   OTHER   Outcome Summary 87 y.o female adm with weakness and recent fall. Of note, pt has L foot drop and does have L AFO in hospital. Pt has been confused, with episode of slurred speech. Per dtr, pt has  previous episodes similiar to this, but then returns to normal function. Pt resides alone and was previously independent up until recently, but fam very involved and able to provide near 24 hr supervision. Pt not currently at high baseline function, and would benefit from an IP Rehab stay to address deficits.

## 2019-11-09 NOTE — THERAPY EVALUATION
Acute Care - Occupational Therapy Initial Evaluation   [facility]     Patient Name: Julianna Myles  : 10/7/1932  MRN: [id number]  Today's Date: 2019             Admit Date: 2019       ICD-10-CM ICD-9-CM   1. Weakness R53.1 780.79   2. Slurred speech R47.81 784.59   3. Fall, initial encounter W19.XXXA E888.9   4. Hypokalemia E87.6 276.8     Patient Active Problem List   Diagnosis   • Weakness     Past Medical History:   Diagnosis Date   • Arthritis    • Elevated cholesterol    • GERD (gastroesophageal reflux disease)      Past Surgical History:   Procedure Laterality Date   • BACK SURGERY            OT ASSESSMENT FLOWSHEET (last 12 hours)      Occupational Therapy Evaluation     Row Name 19 1044                   OT Evaluation Time/Intention    Document Type  evaluation  -KARI        Mode of Treatment  occupational therapy  -KARI           General Information    Patient Profile Reviewed?  yes  -KARI        Prior Level of Function  mod assist:;all household mobility;ADL's;home management recently needing more assist of fam d/t weakness, falls  -KARI        Equipment Currently Used at Home  walker, rolling transport chair, L AFO  -KARI        Pertinent History of Current Functional Problem  adm for weakness  -KARI        Existing Precautions/Restrictions  fall  -KARI           Relationship/Environment    Lives With  alone  -KARI           Bed Mobility Assessment/Treatment    Bed Mobility Assessment/Treatment  bed mobility (all) activities  -        [place] Level (Bed Mobility)  moderate assist (50% patient effort)  -           Functional Mobility    Functional Mobility- Ind. Level  minimum assist (75% patient effort);2 person assist required  -        Functional Mobility- Comment  bilateral HHA  -           Transfer Assessment/Treatment    Transfer Assessment/Treatment  sit-stand transfer;wheelchair transfer  -           Sit-Stand Transfer    Sit-Stand [place] (Transfers)  minimum assist (75% patient  effort)  -KARI           Wheelchair Transfer    Type (Wheelchair Transfer)  sit-stand  -KARI        [place] Level (Wheelchair Transfer)  minimum assist (75% patient effort);2 person assist  -KARI           ADL Assessment/Intervention    BADL Assessment/Intervention  lower body dressing  -KARI           Lower Body Dressing Assessment/Training    Lower Body Dressing [place] Level  moderate assist (50% patient effort)  -KARI        Lower Body Dressing Position  edge of bed sitting  -KARI        Comment (Lower Body Dressing)  has L AFO present in hospital  -KARI           General ROM    GENERAL ROM COMMENTS  BUE WFL  -KARI           MMT (Manual Muscle Testing)    General MMT Comments  BUE 3+/5  -KARI           Positioning and Restraints    Pre-Treatment Position  in bed  -KARI        Post Treatment Position  wheelchair  -KARI        In Wheelchair  sitting;call light within reach;encouraged to call for assist;with family/caregiver  -KARI           Pain Scale: Numbers Pre/Post-Treatment    Pain Scale: Numbers, Pretreatment  0/10 - no pain  -KARI        Pain Scale: Numbers, Post-Treatment  0/10 - no pain  -KARI           Clinical Impression (OT)    Criteria for Skilled Therapeutic Interventions Met (OT Eval)  yes  -KARI        Therapy Frequency (OT Eval)  3 times/wk  -KARI        Care Plan Review, Other Participant (OT Eval)  daughter  -KARI        Anticipated Discharge Disposition (OT)  inpatient rehabilitation facility  -KARI           OT Goals    Transfer Goal Selection (OT)  transfer, OT goal 1  -KARI        Dressing Goal Selection (OT)  dressing, OT goal 1  -KARI        Toileting Goal Selection (OT)  toileting, OT goal 1  -KARI           Transfer Goal 1 (OT)    Activity/Assistive Device (Transfer Goal 1, OT)  transfers, all  -KARI        [place] Level/Cues Needed (Transfer Goal 1, OT)  supervision required  -KARI        Time Frame (Transfer Goal 1, OT)  2 weeks  -KARI           Dressing Goal 1 (OT)    Activity/Assistive Device (Dressing Goal 1, OT)   dressing skills, all  -KARI        [place]/Cues Needed (Dressing Goal 1, OT)  conditional independence  -KARI        Time Frame (Dressing Goal 1, OT)  2 weeks  -KARI           Toileting Goal 1 (OT)    Activity/Device (Toileting Goal 1, OT)  toileting skills, all  -KARI        [place] Level/Cues Needed (Toileting Goal 1, OT)  conditional independence  -KARI        Time Frame (Toileting Goal 1, OT)  2 weeks  -KARI          User Key  (r) = Recorded By, (t) = Taken By, (c) = Cosigned By    Initials Name Effective Dates    KARI Savannah Vazquez, OT 07/25/19 -          Occupational Therapy Education     Title: PT OT SLP Therapies (Done)     Topic: Occupational Therapy (Done)     Point: ADL training (Done)     Description: Instruct learner(s) on proper safety adaptation and remediation techniques during self care or transfers.   Instruct in proper use of assistive devices.    Learning Progress Summary           Patient Acceptance, E,TB, VU by KARI at 11/9/2019 10:49 AM   Family Acceptance, E,TB, VU by KARI at 11/9/2019 10:49 AM                   Point: Home exercise program (Done)     Description: Instruct learner(s) on appropriate technique for monitoring, assisting and/or progressing therapeutic exercises/activities.    Learning Progress Summary           Patient Acceptance, E,TB, VU by KARI at 11/9/2019 10:49 AM   Family Acceptance, E,TB, VU by KARI at 11/9/2019 10:49 AM                   Point: Precautions (Done)     Description: Instruct learner(s) on prescribed precautions during self-care and functional transfers.    Learning Progress Summary           Patient Acceptance, E,TB, VU by KARI at 11/9/2019 10:49 AM   Family Acceptance, E,TB, VU by KARI at 11/9/2019 10:49 AM                   Point: Body mechanics (Done)     Description: Instruct learner(s) on proper positioning and spine alignment during self-care, functional mobility activities and/or exercises.    Learning Progress Summary           Patient Acceptance, E,TB, VU by  KARI at 11/9/2019 10:49 AM   Family Acceptance, E,TB, VU by KARI at 11/9/2019 10:49 AM                               User Key     Initials Effective Dates Name Provider Type Discipline    KARI 07/25/19 -  Savannah Vazquez, SUBHASH Occupational Therapist OT                  OT Recommendation and Plan  Outcome Summary/Treatment Plan (OT)  Anticipated Discharge Disposition (OT): inpatient rehabilitation facility  Therapy Frequency (OT Eval): 3 times/wk  Plan of Care Review  Plan of Care Reviewed With: patient, daughter  Plan of Care Reviewed With: patient, daughter  Outcome Summary: 87 y.o female adm with weakness and recent fall. Of note, pt has L foot drop and does have L AFO in hospital. Pt has been confused, with episode of slurred speech. Per dtr, pt has has previous episodes similiar to this, but then returns to normal function. Pt resides alone and was previously independent up until recently, but fam very involved and able to provide near 24 hr supervision. Pt not curerntly at high baseline function, and would benefit from an IP Rehab stay to address deficits.     Outcome Measures     Row Name 11/09/19 1053 11/09/19 0900 11/09/19 0800       Modified [place] Scale    Pre-Stroke Modified [place] Scale  4 - Moderately severe disability.  Unable to walk without assistance, and unable to attend to own bodily needs without assistance.  -KARI  --  --    Modified [place] Scale  4 - Moderately severe disability.  Unable to walk without assistance, and unable to attend to own bodily needs without assistance.  -KARI  3 - Moderate disability.  Requiring some help, but able to walk without assistance.  -JR  --       Functional Assessment    Outcome Measure Options  Modified [place]  -KARI  --  Modified [place]  -KARI      User Key  (r) = Recorded By, (t) = Taken By, (c) = Cosigned By    Initials Name Provider Type    Viviana Glasgow, PT Physical Therapist    Savannah Christopher, SUBHASH Occupational Therapist          Time Calculation:   Time  Calculation- OT     Row Name 11/09/19 1053             Time Calculation- OT    OT Start Time  0906  -KARI      OT Stop Time  0920  -KARI      OT Time Calculation (min)  14 min  -KARI      Total Timed Code Minutes- OT  0 minute(s)  -KARI      OT Received On  11/09/19  -KARI      OT - Next Appointment  11/11/19  -KARI      OT Goal Re-Cert Due Date  11/23/19  -KARI        User Key  (r) = Recorded By, (t) = Taken By, (c) = Cosigned By    Initials Name Provider Type    Savannah Christopher OT Occupational Therapist        Therapy Charges for Today     Code Description Service Date Service Provider Modifiers Qty    75223384471 HC OT EVAL MOD COMPLEXITY 4 11/9/2019 Savannah Vazquez OT GO 1               Savannah Vazquez OT  11/9/2019

## 2019-11-09 NOTE — H&P
Patient Care Team:  Yessy Spears MD as PCP - General    Chief complaint weakness    Subjective    87-year-old female presented to ER yesterday with a one-week history of intermittent slurred speech, weakness of the left lower extremity.  Family at bedside reports that she lives alone and that they check in on her daily.  When patient had initial episode 1 week ago she followed up with her PCP, Dr. Yessy Spears who checked labs which were told all normal.  Her son reported that she was found on the floor prior day but denies any injury.  Patient is alert to self and place and family at bedside, not time. She denies all c/o pain, illness, N/V, diarrhea currently but did vomit once 2 days ago. Family states episodes of slurred speech last a couple of hours and then improve         Review of Systems   Pertinent items are noted in HPI, all other systems reviewed and negative    History  Past Medical History:   Diagnosis Date   • Arthritis    • Elevated cholesterol    • GERD (gastroesophageal reflux disease)      Past Surgical History:   Procedure Laterality Date   • BACK SURGERY       Family History   Problem Relation Age of Onset   • Heart disease Father      Social History     Tobacco Use   • Smoking status: Never Smoker   • Smokeless tobacco: Never Used   Substance Use Topics   • Alcohol use: No     Frequency: Never   • Drug use: No     Allergies:  Banana    Objective     Vital Signs  Temp:  [97.7 °F (36.5 °C)-98.8 °F (37.1 °C)] 98.7 °F (37.1 °C)  Heart Rate:  [67-82] 67  Resp:  [12-18] 16  BP: (129-150)/() 141/54      Physical Exam:      General Appearance:    Alert, cooperative, in no acute distress   Head:    Normocephalic, without obvious abnormality, atraumatic   Eyes:            Lids and lashes normal, conjunctivae and sclerae normal   Ears:    Ears appear intact with no abnormalities noted   Throat:   No oral lesions, no thrush, oral mucosa moist   Neck:   No adenopathy, supple, trachea midline, no  thyromegaly   Back:     No kyphosis present, no scoliosis present, no skin lesions,      erythema or scars, no tenderness to percussion or                   palpation,   range of motion normal   Lungs:     Clear to auscultation,respirations regular, even and                  unlabored    Heart:    Regular rhythm and normal rate, normal S1 and S2, no            murmur, no gallop, no rub, no click   Chest Wall:    No abnormalities observed   Abdomen:     Normal bowel sounds, no masses, no organomegaly, soft        non-tender, non-distended, no guarding, no rebound                tenderness   Rectal:     Deferred   Extremities:   Moves all extremities well, no edema, no cyanosis, no             Redness, lower ext weakness   Pulses:   Pulses palpable and equal bilaterally   Skin:   No bleeding, bruising or rash   Lymph nodes:   No palpable adenopathy   Neurologic:   Cranial nerves 2 - 12 grossly intact, sensation intact, mild forgetfullness/dementia       Results Review:    I reviewed the patient's new clinical results.  I reviewed the patient's new imaging results and agree with the interpretation.    Assessment/Plan     1. Weakness-PT eval, may need rehab placement  2. Slurred speech-check MRI, MRA head and neck, ECHO. Will decrease pts Gabapentin-check therapeutic drug levels of her seizure medications  3. Forgetfulness/dementia-family states not new, she is at baseline  4. Elevated Chol--start Atorvastatin  5. Story-check stool for occult blood    Expected Length of Stay 3 days    I discussed the patients findings and my recommendations with patient and nursing staff.     Mindy Sewell, APRN  11/09/19  10:26 AM

## 2019-11-09 NOTE — PLAN OF CARE
Problem: Patient Care Overview  Goal: Plan of Care Review  Outcome: Ongoing (interventions implemented as appropriate)   11/09/19 6310   Coping/Psychosocial   Plan of Care Reviewed With patient;family   OTHER   Outcome Summary pt. admit through ER with ongoing c/o weakness and slurred speech. Family reports pt. has also had decline in her mobility over past month. pt.also reports that she has lost about 20 lbs without trying. Family concerned this may be r/t medications. Reports only recent med change was addition of Zonagran about a month ago. pt. is alert, orientated to namebut was unable to recall birthdate or current year. When asked admission questions pt. turns to her daughters for answers.       Problem: Fall Risk (Adult)  Goal: Identify Related Risk Factors and Signs and Symptoms  Outcome: Ongoing (interventions implemented as appropriate)    Goal: Absence of Fall  Outcome: Ongoing (interventions implemented as appropriate)      Problem: Skin Injury Risk (Adult)  Goal: Identify Related Risk Factors and Signs and Symptoms  Outcome: Ongoing (interventions implemented as appropriate)    Goal: Skin Health and Integrity  Outcome: Ongoing (interventions implemented as appropriate)      Problem: Activity Intolerance (Adult)  Goal: Identify Related Risk Factors and Signs and Symptoms  Outcome: Ongoing (interventions implemented as appropriate)    Goal: Activity Tolerance  Outcome: Ongoing (interventions implemented as appropriate)    Goal: Effective Energy Conservation Techniques  Outcome: Ongoing (interventions implemented as appropriate)

## 2019-11-09 NOTE — PLAN OF CARE
Problem: Fall Risk (Adult)  Goal: Absence of Fall  Outcome: Ongoing (interventions implemented as appropriate)   11/09/19 1528   Fall Risk (Adult)   Absence of Fall making progress toward outcome       Problem: Skin Injury Risk (Adult)  Goal: Skin Health and Integrity  Outcome: Ongoing (interventions implemented as appropriate)   11/09/19 1528   Skin Injury Risk (Adult)   Skin Health and Integrity making progress toward outcome       Problem: Activity Intolerance (Adult)  Goal: Activity Tolerance  Outcome: Ongoing (interventions implemented as appropriate)   11/09/19 1528   Activity Intolerance (Adult)   Activity Tolerance making progress toward outcome

## 2019-11-09 NOTE — THERAPY EVALUATION
Acute Care - Physical Therapy Initial Evaluation  [facility]     Patient Name: Julianna Myles  : 10/7/1932  MRN: [id number]  Today's Date: 2019      Date of Referral to PT: 19         Admit Date: 2019    Visit Dx:     ICD-10-CM ICD-9-CM   1. Weakness R53.1 780.79   2. Slurred speech R47.81 784.59   3. Fall, initial encounter W19.XXXA E888.9   4. Hypokalemia E87.6 276.8     Patient Active Problem List   Diagnosis   • Weakness     Past Medical History:   Diagnosis Date   • Arthritis    • Elevated cholesterol    • GERD (gastroesophageal reflux disease)      Past Surgical History:   Procedure Laterality Date   • BACK SURGERY          PT ASSESSMENT (last 12 hours)      Physical Therapy Evaluation     Row Name 19          PT Evaluation Time/Intention    Subjective Information  complains of;weakness  -JR     Document Type  evaluation  -     Mode of Treatment  physical therapy  -     Row Name 19          General Information    Patient Profile Reviewed?  yes  -     Prior Level of Function  min assist:;all household mobility  -     Equipment Currently Used at Home  walker, rolling;other (see comments) AFO  -     Row Name 19          Cognitive Assessment/Intervention- PT/OT    Orientation Status (Cognition)  oriented to;person;place  -     Follows Commands (Cognition)  follows one step commands  -JR     Row Name 19          Bed Mobility Assessment/Treatment    Bed Mobility Assessment/Treatment  bed mobility (all) activities  -     [place] Level (Bed Mobility)  moderate assist (50% patient effort)  -     Row Name 19          Transfer Assessment/Treatment    Transfer Assessment/Treatment  sit-stand transfer;bed-chair transfer  -     Bed-Chair [place] (Transfers)  moderate assist (50% patient effort)  -     Assistive Device (Bed-Chair Transfers)  walker, front-wheeled  -     Sit-Stand [place] (Transfers)  minimum assist (75%  patient effort)  -     Row Name 11/09/19 0900          Sit-Stand Transfer    Assistive Device (Sit-Stand Transfers)  walker, front-wheeled  -     Row Name 11/09/19 0900          Gait/Stairs Assessment/Training    Gait/Stairs Assessment/Training  gait/ambulation assistive device  -     Media Level (Gait)  2 person assist hand held a x2  -JR     Distance in Feet (Gait)  20  -     Row Name 11/09/19 0900          General ROM    GENERAL ROM COMMENTS  LE WNL  -     Row Name 11/09/19 0900          MMT (Manual Muscle Testing)    General MMT Comments  LE 4/5  -     Row Name 11/09/19 0900          Pain Assessment    Additional Documentation  Pain Scale: Numbers Pre/Post-Treatment (Group)  -     Row Name 11/09/19 0900          Pain Scale: Numbers Pre/Post-Treatment    Pain Scale: Numbers, Pretreatment  0/10 - no pain  -JR     Pain Scale: Numbers, Post-Treatment  0/10 - no pain  -     Row Name 11/09/19 0900          Physical Therapy Clinical Impression    Date of Referral to PT  11/09/19  -JR     PT Diagnosis (PT Clinical Impression)  — weakness  -JR     Prognosis (PT Clinical Impression)  — good  -JR     Functional Level at Time of Evaluation (PT Clinical Impression)  — needs a for all mobility and transfers  -JR     Criteria for Skilled Interventions Met (PT Clinical Impression)  yes  -JR     Impairments Found (describe specific impairments)  gait, locomotion, and balance  -JR     Rehab Potential (PT Clinical Summary)  good, to achieve stated therapy goals  -JR     Care Plan Review (PT)  evaluation/treatment results reviewed  -     Row Name 11/09/19 0900          Physical Therapy Goals    Bed Mobility Goal Selection (PT)  bed mobility, PT goal 1  -JR     Transfer Goal Selection (PT)  transfer, PT goal 1  -JR     Gait Training Goal Selection (PT)  gait training, PT goal 1  -     Row Name 11/09/19 0900          Bed Mobility Goal 1 (PT)    Activity/Assistive Device (Bed Mobility Goal 1, PT)  bed  mobility activities, all  -JR     [place] Level/Cues Needed (Bed Mobility Goal 1, PT)  conditional independence  -JR     Time Frame (Bed Mobility Goal 1, PT)  2 weeks  -JR     Row Name 11/09/19 0900          Transfer Goal 1 (PT)    Activity/Assistive Device (Transfer Goal 1, PT)  transfers, all  -JR     [place] Level/Cues Needed (Transfer Goal 1, PT)  conditional independence  -JR     Time Frame (Transfer Goal 1, PT)  2 weeks  -JR     Row Name 11/09/19 0900          Gait Training Goal 1 (PT)    Activity/Assistive Device (Gait Training Goal 1, PT)  gait (walking locomotion);assistive device use  -JR     [place] Level (Gait Training Goal 1, PT)  minimum assist (75% or more patient effort)  -JR     Distance (Gait Goal 1, PT)  100  -JR     Time Frame (Gait Training Goal 1, PT)  2 weeks  -     Row Name 11/09/19 0900          Positioning and Restraints    Pre-Treatment Position  in bed  -JR     Post Treatment Position  wheelchair  -JR     In Wheelchair  notified nsg;sitting;call light within reach;encouraged to call for assist;exit alarm on;with family/caregiver  -       User Key  (r) = Recorded By, (t) = Taken By, (c) = Cosigned By    Initials Name Provider Type    Viviana Glasgow, MARC Physical Therapist        Physical Therapy Education     Title: PT OT SLP Therapies (Done)     Topic: Physical Therapy (Done)     Point: Mobility training (Done)     Learning Progress Summary           Patient Acceptance, E,TB, VU by  at 11/9/2019  9:48 AM                               User Key     Initials Effective Dates Name Provider Type Discipline     06/10/19 -  Viviana Gooden PT Physical Therapist PT              PT Recommendation and Plan  Anticipated Discharge Disposition (PT): inpatient rehabilitation facility  Planned Therapy Interventions (PT Eval): bed mobility training, gait training, home exercise program, patient/family education, strengthening, transfer training  Therapy Frequency (PT Clinical  Impression): 3 times/wk  Outcome Summary/Treatment Plan (PT)  Anticipated Discharge Disposition (PT): inpatient rehabilitation facility  Plan of Care Reviewed With: patient, daughter  Outcome Summary: patient hospitalized with weakness and history of falls.  She has been confused and family states slurred speech,   She lives at home alone with frequent and nearly 24 hour assist from family.  Family states she was ambulatory prior to hospitalization with rolling walker and brace on left foot for longstanding drop foot. She needs A for all mobiltiy and transfers for safety.  She is pleasant and oriented to self.  She responds well to commands and participates well in therapy .  She would benefit from IP rehab at [place] [facility]' to decline in functional status and living alone  Outcome Measures     Row Name 11/09/19 0900 11/09/19 0800          Modified [place] Scale    Modified [place] Scale  3 - Moderate disability.  Requiring some help, but able to walk without assistance.  -JR  —        Functional Assessment    Outcome Measure Options  —  Modified [place]  -KARI       User Key  (r) = Recorded By, (t) = Taken By, (c) = Cosigned By    Initials Name Provider Type    Viviana Glasgow PT Physical Therapist    Savannah Christopher, OT Occupational Therapist         Time Calculation:   PT Charges     Row Name 11/09/19 0953             Time Calculation    Start Time  0910  -      Stop Time  0940  -      Time Calculation (min)  30 min  -      PT Received On  11/09/19  -      PT - Next Appointment  11/11/19  -      PT Goal Re-Cert Due Date  11/23/19  -        User Key  (r) = Recorded By, (t) = Taken By, (c) = Cosigned By    Initials Name Provider Type    Viviana Glasgow, PT Physical Therapist        Therapy Charges for Today     Code Description Service Date Service Provider Modifiers Qty    [id number]  GAIT TRAINING EA 15 MIN 11/9/2019 Viviana Gooden, PT GP 1    [id number] HC PT EVAL HIGH COMPLEXITY 4 11/9/2019  Viviana Gooden, PT GP 1          PT G-Codes  Outcome Measure Options: Modified [place]  Modified [place] Scale: 3 - Moderate disability.  Requiring some help, but able to walk without assistance.      Viviana Gooden, PT  11/9/2019

## 2019-11-09 NOTE — PLAN OF CARE
Problem: Patient Care Overview  Goal: Plan of Care Review  Outcome: Ongoing (interventions implemented as appropriate)   11/09/19 9950   Coping/Psychosocial   Plan of Care Reviewed With patient;daughter   OTHER   Outcome Summary patient hospitalized with weakness and history of falls. She has been confused and family states slurred speech, She lives at home alone with frequent and nearly 24 hour assist from family. Family states she was ambulatory prior to hospitalization with rolling walker and brace on left foot for longstanding drop foot. She needs A for all mobiltiy and transfers for safety. She is pleasant and oriented to self. She responds well to commands and participates well in therapy . She would benefit from IP rehab at [place] [facility]' to decline in functional status and living alone

## 2019-11-10 ENCOUNTER — APPOINTMENT (OUTPATIENT)
Dept: CT IMAGING | Facility: HOSPITAL | Age: 84
End: 2019-11-10

## 2019-11-10 LAB
BH CV ECHO MEAS - % IVS THICK: 63.7 %
BH CV ECHO MEAS - % LVPW THICK: 55.6 %
BH CV ECHO MEAS - ACS: 1.8 CM
BH CV ECHO MEAS - AO MAX PG (FULL): 5.7 MMHG
BH CV ECHO MEAS - AO MAX PG: 15.4 MMHG
BH CV ECHO MEAS - AO MEAN PG (FULL): 2.4 MMHG
BH CV ECHO MEAS - AO MEAN PG: 7.4 MMHG
BH CV ECHO MEAS - AO ROOT AREA (BSA CORRECTED): 2.1
BH CV ECHO MEAS - AO ROOT AREA: 7.7 CM^2
BH CV ECHO MEAS - AO ROOT DIAM: 3.1 CM
BH CV ECHO MEAS - AO V2 MAX: 195.4 CM/SEC
BH CV ECHO MEAS - AO V2 MEAN: 126.4 CM/SEC
BH CV ECHO MEAS - AO V2 VTI: 40.1 CM
BH CV ECHO MEAS - AVA(I,A): 1.9 CM^2
BH CV ECHO MEAS - AVA(I,D): 1.9 CM^2
BH CV ECHO MEAS - AVA(V,A): 2.1 CM^2
BH CV ECHO MEAS - AVA(V,D): 2.1 CM^2
BH CV ECHO MEAS - BSA(HAYCOCK): 1.5 M^2
BH CV ECHO MEAS - BSA: 1.5 M^2
BH CV ECHO MEAS - BZI_BMI: 22.5 KILOGRAMS/M^2
BH CV ECHO MEAS - BZI_METRIC_HEIGHT: 152.4 CM
BH CV ECHO MEAS - BZI_METRIC_WEIGHT: 52.2 KG
BH CV ECHO MEAS - EDV(CUBED): 95.8 ML
BH CV ECHO MEAS - EDV(MOD-SP2): 50.4 ML
BH CV ECHO MEAS - EDV(MOD-SP4): 43.1 ML
BH CV ECHO MEAS - EDV(TEICH): 96.1 ML
BH CV ECHO MEAS - EF(CUBED): 83.8 %
BH CV ECHO MEAS - EF(MOD-BP): 70 %
BH CV ECHO MEAS - EF(MOD-SP2): 76 %
BH CV ECHO MEAS - EF(MOD-SP4): 66.5 %
BH CV ECHO MEAS - EF(TEICH): 76.9 %
BH CV ECHO MEAS - ESV(CUBED): 15.6 ML
BH CV ECHO MEAS - ESV(MOD-SP2): 12.1 ML
BH CV ECHO MEAS - ESV(MOD-SP4): 14.4 ML
BH CV ECHO MEAS - ESV(TEICH): 22.2 ML
BH CV ECHO MEAS - FS: 45.4 %
BH CV ECHO MEAS - IVS/LVPW: 1.2
BH CV ECHO MEAS - IVSD: 1.2 CM
BH CV ECHO MEAS - IVSS: 2 CM
BH CV ECHO MEAS - LA DIMENSION(2D): 3.9 CM
BH CV ECHO MEAS - LV DIASTOLIC VOL/BSA (35-75): 29.2 ML/M^2
BH CV ECHO MEAS - LV MASS(C)D: 190 GRAMS
BH CV ECHO MEAS - LV MASS(C)DI: 128.8 GRAMS/M^2
BH CV ECHO MEAS - LV MASS(C)S: 181.9 GRAMS
BH CV ECHO MEAS - LV MASS(C)SI: 123.3 GRAMS/M^2
BH CV ECHO MEAS - LV MAX PG: 9.7 MMHG
BH CV ECHO MEAS - LV MEAN PG: 4.9 MMHG
BH CV ECHO MEAS - LV SYSTOLIC VOL/BSA (12-30): 9.8 ML/M^2
BH CV ECHO MEAS - LV V1 MAX: 155.7 CM/SEC
BH CV ECHO MEAS - LV V1 MEAN: 102.5 CM/SEC
BH CV ECHO MEAS - LV V1 VTI: 29.1 CM
BH CV ECHO MEAS - LVIDD: 4.6 CM
BH CV ECHO MEAS - LVIDS: 2.5 CM
BH CV ECHO MEAS - LVOT AREA: 2.6 CM^2
BH CV ECHO MEAS - LVOT DIAM: 1.8 CM
BH CV ECHO MEAS - LVPWD: 1 CM
BH CV ECHO MEAS - LVPWS: 1.6 CM
BH CV ECHO MEAS - MV A MAX VEL: 123.6 CM/SEC
BH CV ECHO MEAS - MV DEC SLOPE: 558.1 CM/SEC^2
BH CV ECHO MEAS - MV DEC TIME: 0.23 SEC
BH CV ECHO MEAS - MV E MAX VEL: 64.3 CM/SEC
BH CV ECHO MEAS - MV E/A: 0.52
BH CV ECHO MEAS - MV MAX PG: 7.5 MMHG
BH CV ECHO MEAS - MV MEAN PG: 3 MMHG
BH CV ECHO MEAS - MV V2 MAX: 136.8 CM/SEC
BH CV ECHO MEAS - MV V2 MEAN: 78.8 CM/SEC
BH CV ECHO MEAS - MV V2 VTI: 40.3 CM
BH CV ECHO MEAS - MVA(VTI): 1.9 CM^2
BH CV ECHO MEAS - PA ACC TIME: 0.09 SEC
BH CV ECHO MEAS - PA MAX PG (FULL): 0.55 MMHG
BH CV ECHO MEAS - PA MAX PG: 6.3 MMHG
BH CV ECHO MEAS - PA MEAN PG (FULL): 0.46 MMHG
BH CV ECHO MEAS - PA MEAN PG: 3.2 MMHG
BH CV ECHO MEAS - PA PR(ACCEL): 40.1 MMHG
BH CV ECHO MEAS - PA V2 MAX: 125.1 CM/SEC
BH CV ECHO MEAS - PA V2 MEAN: 82.7 CM/SEC
BH CV ECHO MEAS - PA V2 VTI: 21.5 CM
BH CV ECHO MEAS - PVA(I,A): 2 CM^2
BH CV ECHO MEAS - PVA(I,D): 2 CM^2
BH CV ECHO MEAS - PVA(V,A): 2.2 CM^2
BH CV ECHO MEAS - PVA(V,D): 2.2 CM^2
BH CV ECHO MEAS - QP/QS: 0.56
BH CV ECHO MEAS - RAP SYSTOLE: 3 MMHG
BH CV ECHO MEAS - RV MAX PG: 5.7 MMHG
BH CV ECHO MEAS - RV MEAN PG: 2.7 MMHG
BH CV ECHO MEAS - RV V1 MAX: 119.6 CM/SEC
BH CV ECHO MEAS - RV V1 MEAN: 72.3 CM/SEC
BH CV ECHO MEAS - RV V1 VTI: 18.8 CM
BH CV ECHO MEAS - RVDD: 2.7 CM
BH CV ECHO MEAS - RVOT AREA: 2.3 CM^2
BH CV ECHO MEAS - RVOT DIAM: 1.7 CM
BH CV ECHO MEAS - RVSP: 34.3 MMHG
BH CV ECHO MEAS - SI(AO): 208.8 ML/M^2
BH CV ECHO MEAS - SI(CUBED): 54.4 ML/M^2
BH CV ECHO MEAS - SI(LVOT): 52.2 ML/M^2
BH CV ECHO MEAS - SI(MOD-SP2): 26 ML/M^2
BH CV ECHO MEAS - SI(MOD-SP4): 19.4 ML/M^2
BH CV ECHO MEAS - SI(TEICH): 50.1 ML/M^2
BH CV ECHO MEAS - SV(AO): 308.1 ML
BH CV ECHO MEAS - SV(CUBED): 80.2 ML
BH CV ECHO MEAS - SV(LVOT): 77 ML
BH CV ECHO MEAS - SV(MOD-SP2): 38.3 ML
BH CV ECHO MEAS - SV(MOD-SP4): 28.7 ML
BH CV ECHO MEAS - SV(RVOT): 43.4 ML
BH CV ECHO MEAS - SV(TEICH): 73.9 ML
BH CV ECHO MEAS - TR MAX VEL: 279.9 CM/SEC
MAXIMAL PREDICTED HEART RATE: 133 BPM
STRESS TARGET HR: 113 BPM

## 2019-11-10 PROCEDURE — 70498 CT ANGIOGRAPHY NECK: CPT

## 2019-11-10 PROCEDURE — 94799 UNLISTED PULMONARY SVC/PX: CPT

## 2019-11-10 PROCEDURE — 0 IOPAMIDOL PER 1 ML: Performed by: FAMILY MEDICINE

## 2019-11-10 PROCEDURE — 70496 CT ANGIOGRAPHY HEAD: CPT

## 2019-11-10 PROCEDURE — 93306 TTE W/DOPPLER COMPLETE: CPT | Performed by: INTERNAL MEDICINE

## 2019-11-10 PROCEDURE — 25010000002 CEFTRIAXONE PER 250 MG: Performed by: NURSE PRACTITIONER

## 2019-11-10 RX ADMIN — IBUPROFEN 800 MG: 400 TABLET ORAL at 17:37

## 2019-11-10 RX ADMIN — CARBAMAZEPINE 200 MG: 200 TABLET ORAL at 06:40

## 2019-11-10 RX ADMIN — IOPAMIDOL 100 ML: 755 INJECTION, SOLUTION INTRAVENOUS at 17:30

## 2019-11-10 RX ADMIN — Medication 10 ML: at 20:18

## 2019-11-10 RX ADMIN — ATORVASTATIN CALCIUM 20 MG: 20 TABLET, FILM COATED ORAL at 20:18

## 2019-11-10 RX ADMIN — HYDRALAZINE HYDROCHLORIDE 100 MG: 25 TABLET, FILM COATED ORAL at 20:18

## 2019-11-10 RX ADMIN — GABAPENTIN 100 MG: 100 CAPSULE ORAL at 17:38

## 2019-11-10 RX ADMIN — HYDRALAZINE HYDROCHLORIDE 100 MG: 25 TABLET, FILM COATED ORAL at 10:09

## 2019-11-10 RX ADMIN — POTASSIUM CHLORIDE 20 MEQ: 1500 TABLET, EXTENDED RELEASE ORAL at 10:09

## 2019-11-10 RX ADMIN — ZONISAMIDE 100 MG: 100 CAPSULE ORAL at 17:50

## 2019-11-10 RX ADMIN — CARBAMAZEPINE 200 MG: 200 TABLET ORAL at 00:36

## 2019-11-10 RX ADMIN — CARBAMAZEPINE 200 MG: 200 TABLET ORAL at 12:18

## 2019-11-10 RX ADMIN — CARBAMAZEPINE 200 MG: 200 TABLET ORAL at 17:38

## 2019-11-10 RX ADMIN — ZONISAMIDE 100 MG: 100 CAPSULE ORAL at 06:42

## 2019-11-10 RX ADMIN — ZONISAMIDE 100 MG: 100 CAPSULE ORAL at 12:18

## 2019-11-10 RX ADMIN — IBUPROFEN 800 MG: 400 TABLET ORAL at 06:40

## 2019-11-10 RX ADMIN — IBUPROFEN 800 MG: 400 TABLET ORAL at 20:19

## 2019-11-10 RX ADMIN — ZONISAMIDE 100 MG: 100 CAPSULE ORAL at 00:37

## 2019-11-10 RX ADMIN — GABAPENTIN 100 MG: 100 CAPSULE ORAL at 06:40

## 2019-11-10 RX ADMIN — CEFTRIAXONE SODIUM 1 G: 1 INJECTION, POWDER, FOR SOLUTION INTRAMUSCULAR; INTRAVENOUS at 17:40

## 2019-11-10 RX ADMIN — GABAPENTIN 100 MG: 100 CAPSULE ORAL at 20:19

## 2019-11-10 RX ADMIN — Medication 10 ML: at 10:08

## 2019-11-10 RX ADMIN — PANTOPRAZOLE SODIUM 40 MG: 40 TABLET, DELAYED RELEASE ORAL at 06:41

## 2019-11-10 NOTE — PROGRESS NOTES
LOS: 0 days   Patient Care Team:  Yessy Spears MD as PCP - General    Chief Complaint:  Weakness    Subjective     Interval History:     Patient Complaints: weakness, R ear pain  Patient Denies:  CP, HA, ST, Cough, congestion, N/V, diarrhea  History taken from: patient and family    Review of Systems:   Review of Systems  Objective     Vital Signs  Temp:  [97.4 °F (36.3 °C)-98.7 °F (37.1 °C)] 97.4 °F (36.3 °C)  Heart Rate:  [69-77] 70  Resp:  [12-16] 14  BP: (115-151)/(44-70) 145/59    Physical Exam:     General Appearance:    Alert, cooperative, in no acute distress   Head:    Normocephalic, without obvious abnormality, atraumatic   Eyes:            Lids and lashes normal, conjunctivae and sclerae normal   Ears:    R ear canal-wax impaction, L canal and TM clear   Throat:   No oral lesions, no thrush, oral mucosa moist   Neck:   No adenopathy, supple, trachea midline   Back:     No kyphosis present, no scoliosis present, no skin lesions,      erythema or scars, no tenderness to percussion or                   palpation,   range of motion normal   Lungs:     Clear to auscultation,respirations regular, even and                  unlabored    Heart:    Regular rhythm and normal rate, normal S1 and S2, no            murmur, no gallop, no rub, no click   Chest Wall:    No abnormalities observed   Abdomen:     Normal bowel sounds, no masses, no organomegaly, soft        non-tender, non-distended, no guarding, no rebound                tenderness   Rectal:     Deferred   Extremities:   Moves all extremities well, no edema, no cyanosis, no             redness   Pulses:   Pulses palpable and equal bilaterally   Skin:   No bleeding, bruising or rash   Lymph nodes:   No palpable adenopathy   Neurologic:   Cranial nerves 2 - 12 grossly intact, sensation intact        Results Review:     I reviewed the patient's new clinical results.  I reviewed the patient's new imaging results and agree with the  interpretation.    Medication Review:   Scheduled Meds:  atorvastatin 20 mg Oral Nightly   carBAMazepine 200 mg Oral Q6H   ceftriaxone 1 g Intravenous Q24H   gabapentin 100 mg Oral Q8H   hydrALAZINE 100 mg Oral Q12H   ibuprofen 800 mg Oral Q8H   pantoprazole 40 mg Oral QAM   potassium chloride 20 mEq Oral Daily   sodium chloride 10 mL Intravenous Q12H   zonisamide 100 mg Oral Q6H     Continuous Infusions:   PRN Meds:.influenza vaccine  •  sodium chloride  •  sodium chloride    Labs:  Lab Results (last 24 hours)     Procedure Component Value Units Date/Time    Occult Blood, Fecal By Immunoassay - Stool, Per Rectum [[id number]]  (Normal) Collected:  11/09/19 1537    Specimen:  Stool from Per Rectum Updated:  11/09/19 1816     Occult Blood, Fecal by Immunoassay Negative    Vitamin B12 [[id number]]  (Normal) Collected:  11/09/19 0530    Specimen:  Blood Updated:  11/09/19 1203     Vitamin B-12 286 pg/mL     Carbamazepine Level, Free & Total [[id number]] Collected:  11/09/19 1023    Specimen:  Blood Updated:  11/09/19 1050    Zonisamide Level [[id number]] Collected:  11/09/19 1023    Specimen:  Blood Updated:  11/09/19 1050           Assessment/Plan       Weakness    1. Weakness-PT eval, will need rehab placement  2. Slurred speech-check MRI, MRA head and neck, ECHO. Will decrease pts Gabapentin-check therapeutic drug levels of her seizure medications. 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 Forgetfulness/dementia-family states not new, she is at baseline  4. Elevated Chol--start Atorvastatin  5. Hegins-check stool for occult blood-negative, will continue to monitor             Mindy Sewell, RANDOLPH  11/10/19  9:03 AM

## 2019-11-10 NOTE — DISCHARGE PLACEMENT REQUEST
"Minnie Myles (87 y.o. Female)     Date of Birth Social Security Number Address Home Phone MRN    10/07/1932  [id number]   [place] [facility] [phone] [id number]    Jew Marital Status          Other        Admission Date Admission Type Admitting Provider Attending Provider Department, Room/Bed    11/8/19 Emergency Pavan Spears MD Heimer, Brian T, MD [facility] NEURO HEART, 271/1    Discharge Date Discharge Disposition Discharge Destination                       Attending Provider:  Pavan Spears MD    Allergies:  Banana    Isolation:  None   Infection:  None   Code Status:  CPR    Ht:  152.4 cm (60\")   Wt:  53.3 kg (117 lb 8.1 oz)    Admission Cmt:  None   Principal Problem:  None                Active Insurance as of 11/8/2019     Primary Coverage     Payor Plan Insurance Group Employer/Plan Group    MEDICARE MEDICARE A & B      Payor Plan Address Payor Plan Phone Number Payor Plan Fax Number Effective Dates    [address] [phone]  10/1/1997 - None Entered    [facility] [id number]       Subscriber Name Subscriber Birth Date Member ID       MINNIE MYLES 10/7/1932 [id number]           Secondary Coverage     Payor Plan Insurance Group Employer/Plan Group    [employer] CIGNA MEDICARE SUPPLEMENT SOLUTIONS      Payor Plan Address Payor Plan Phone Number Payor Plan Fax Number Effective Dates    [address] [address]   1/1/2015 - None Entered    [facility] [id number]       Subscriber Name Subscriber Birth Date Member ID       MINNIE MYLES 10/7/1932 [id number]                 Emergency Contacts      (Rel.) Home Phone Work Phone Mobile Phone    NEMESIO MARIE (Daughter) [phone] -- [phone]    Gale Martinez (Daughter) [phone] -- --    SharMarcial (Son) [phone] -- --        Continued Stay Note  [facility]     Patient Name: Minnie Myles  MRN: [id number]  Today's Date: 11/10/2019    Admit Date: 11/8/2019    Discharge Plan     Row Name 11/10/19 0843       " Plan    Plan  DC PLAN:  [facility] (level pending) P: [phone]- referral made 11/10/19 at 0840 and pending acceptance. No PASRR required for [facility]. No precert needed.     Plan Comments  I met with pt and family at bedside to discuss discharge plans. Pt lives alone and has worsening weakness with falls prior to admission.  Pt and dgt are agreeable to inpatient rehab: services, coverage, and options discusses. Per pt and dgt their rehab choices: [facility]- referral made 11/10/19 at 0840, and [facility]- no referral made as of yet.  Pt's status was converted to inpatient on 11/10/19 and she will be able to transfer to inpatient rehab on 11/13/19 if medically appropriate.   to follow up with [facility] rep on 11/11/19 to assess if they can accept.  A PASRR will be needed if pt does not go to [facility].  Will continue to follow and update as needed.         Discharge Codes    No documentation.             Aleshia Farris RN         History & Physical      KevilMindy, APRN at 11/09/19 1026              Patient Care Team:  Yessy Spears MD as PCP - General    Chief complaint weakness    Subjective    87-year-old female presented to ER yesterday with a one-week history of intermittent slurred speech, weakness of the left lower extremity.  Family at bedside reports that she lives alone and that they check in on her daily.  When patient had initial episode 1 week ago she followed up with her PCP, Dr. Yessy Spears who checked labs which were told all normal.  Her son reported that she was found on the floor prior day but denies any injury.  Patient is alert to self and place and family at bedside, not time. She denies all c/o pain, illness, N/V, diarrhea currently but did vomit once 2 days ago. Family states episodes of slurred speech last a couple of hours and then improve         Review of Systems   Pertinent items are noted in HPI, all other systems reviewed and negative    History  Past Medical History:   Diagnosis Date    • Arthritis    • Elevated cholesterol    • GERD (gastroesophageal reflux disease)      Past Surgical History:   Procedure Laterality Date   • BACK SURGERY       Family History   Problem Relation Age of Onset   • Heart disease Father      Social History     Tobacco Use   • Smoking status: Never Smoker   • Smokeless tobacco: Never Used   Substance Use Topics   • Alcohol use: No     Frequency: Never   • Drug use: No     Allergies:  Banana    Objective     Vital Signs  Temp:  [97.7 °F (36.5 °C)-98.8 °F (37.1 °C)] 98.7 °F (37.1 °C)  Heart Rate:  [67-82] 67  Resp:  [12-18] 16  BP: (129-150)/() 141/54      Physical Exam:      General Appearance:    Alert, cooperative, in no acute distress   Head:    Normocephalic, without obvious abnormality, atraumatic   Eyes:            Lids and lashes normal, conjunctivae and sclerae normal   Ears:    Ears appear intact with no abnormalities noted   Throat:   No oral lesions, no thrush, oral mucosa moist   Neck:   No adenopathy, supple, trachea midline, no thyromegaly   Back:     No kyphosis present, no scoliosis present, no skin lesions,      erythema or scars, no tenderness to percussion or                   palpation,   range of motion normal   Lungs:     Clear to auscultation,respirations regular, even and                  unlabored    Heart:    Regular rhythm and normal rate, normal S1 and S2, no            murmur, no gallop, no rub, no click   Chest Wall:    No abnormalities observed   Abdomen:     Normal bowel sounds, no masses, no organomegaly, soft        non-tender, non-distended, no guarding, no rebound                tenderness   Rectal:     Deferred   Extremities:   Moves all extremities well, no edema, no cyanosis, no             Redness, lower ext weakness   Pulses:   Pulses palpable and equal bilaterally   Skin:   No bleeding, bruising or rash   Lymph nodes:   No palpable adenopathy   Neurologic:   Cranial nerves 2 - 12 grossly intact, sensation intact, mild  forgetfullness/dementia       Results Review:    I reviewed the patient's new clinical results.  I reviewed the patient's new imaging results and agree with the interpretation.    Assessment/Plan     1. Weakness-PT eval, may need rehab placement  2. Slurred speech-check MRI, MRA head and neck, ECHO. Will decrease pts Gabapentin-check therapeutic drug levels of her seizure medications  3. Forgetfulness/dementia-family states not new, she is at baseline  4. Elevated Chol--start Atorvastatin  5. [place]-check stool for occult blood    Expected Length of Stay 3 days    I discussed the patients findings and my recommendations with patient and nursing staff.     RANDOLPH Jason  11/09/19  10:26 AM        Electronically signed by Mindy Sewell APRN at 11/09/19 1036          Physical Therapy Notes (last 24 hours) (Notes from 11/09/19 0844 through 11/10/19 0844)      Viviana Gooden, PT at 11/09/19 0979  Version 1 of 1         Problem: Patient Care Overview  Goal: Plan of Care Review  Outcome: Ongoing (interventions implemented as appropriate)   11/09/19 0949   Coping/Psychosocial   Plan of Care Reviewed With patient;daughter   OTHER   Outcome Summary patient hospitalized with weakness and history of falls. She has been confused and family states slurred speech, She lives at home alone with frequent and nearly 24 hour assist from family. Family states she was ambulatory prior to hospitalization with rolling walker and brace on left foot for longstanding drop foot. She needs A for all mobiltiy and transfers for safety. She is pleasant and oriented to self. She responds well to commands and participates well in therapy . She would benefit from IP rehab at [place] 2' to decline in functional status and living alone           Electronically signed by Viviana Gooden, PT at 11/09/19 0911     Viviana Gooden, PT at 11/09/19 0962  Version 1 of 1         Acute Care - Physical Therapy Initial Evaluation  [facility]      Patient Name: Julianna Myles  : 10/7/1932  MRN: [id number]  Today's Date: 2019      Date of Referral to PT: 19         Admit Date: 2019    Visit Dx:     ICD-10-CM ICD-9-CM   1. Weakness R53.1 780.79   2. Slurred speech R47.81 784.59   3. Fall, initial encounter W19.XXXA E888.9   4. Hypokalemia E87.6 276.8     Patient Active Problem List   Diagnosis   • Weakness     Past Medical History:   Diagnosis Date   • Arthritis    • Elevated cholesterol    • GERD (gastroesophageal reflux disease)      Past Surgical History:   Procedure Laterality Date   • BACK SURGERY          PT ASSESSMENT (last 12 hours)      Physical Therapy Evaluation     Row Name 19          PT Evaluation Time/Intention    Subjective Information  complains of;weakness  -     Document Type  evaluation  -     Mode of Treatment  physical therapy  -     Row Name 19          General Information    Patient Profile Reviewed?  yes  -JR     Prior Level of Function  min assist:;all household mobility  -     Equipment Currently Used at Home  walker, rolling;other (see comments) AFO  -     Row Name 19          Cognitive Assessment/Intervention- PT/OT    Orientation Status (Cognition)  oriented to;person;place  -     Follows Commands (Cognition)  follows one step commands  -     Row Name 19          Bed Mobility Assessment/Treatment    Bed Mobility Assessment/Treatment  bed mobility (all) activities  -     [place] Level (Bed Mobility)  moderate assist (50% patient effort)  -     Row Name 19          Transfer Assessment/Treatment    Transfer Assessment/Treatment  sit-stand transfer;bed-chair transfer  -     Bed-Chair [place] (Transfers)  moderate assist (50% patient effort)  -     Assistive Device (Bed-Chair Transfers)  walker, front-wheeled  -     Sit-Stand [place] (Transfers)  minimum assist (75% patient effort)  -     Row Name 19           Sit-Stand Transfer    Assistive Device (Sit-Stand Transfers)  walker, front-wheeled  -     Row Name 11/09/19 0900          Gait/Stairs Assessment/Training    Gait/Stairs Assessment/Training  gait/ambulation assistive device  -     [place] Level (Gait)  2 person assist hand held a x2  -JR     Distance in Feet (Gait)  20  -     Row Name 11/09/19 0900          General ROM    GENERAL ROM COMMENTS  LE WNL  -     Row Name 11/09/19 0900          MMT (Manual Muscle Testing)    General MMT Comments  LE 4/5  -     Row Name 11/09/19 0900          Pain Assessment    Additional Documentation  Pain Scale: Numbers Pre/Post-Treatment (Group)  -     Row Name 11/09/19 0900          Pain Scale: Numbers Pre/Post-Treatment    Pain Scale: Numbers, Pretreatment  0/10 - no pain  -JR     Pain Scale: Numbers, Post-Treatment  0/10 - no pain  -     Row Name 11/09/19 0900          Physical Therapy Clinical Impression    Date of Referral to PT  11/09/19  -JR     PT Diagnosis (PT Clinical Impression)  -- weakness  -JR     Prognosis (PT Clinical Impression)  -- good  -JR     Functional Level at Time of Evaluation (PT Clinical Impression)  -- needs a for all mobility and transfers  -JR     Criteria for Skilled Interventions Met (PT Clinical Impression)  yes  -JR     Impairments Found (describe specific impairments)  gait, locomotion, and balance  -JR     Rehab Potential (PT Clinical Summary)  good, to achieve stated therapy goals  -JR     Care Plan Review (PT)  evaluation/treatment results reviewed  -     Row Name 11/09/19 0900          Physical Therapy Goals    Bed Mobility Goal Selection (PT)  bed mobility, PT goal 1  -JR     Transfer Goal Selection (PT)  transfer, PT goal 1  -JR     Gait Training Goal Selection (PT)  gait training, PT goal 1  -     Row Name 11/09/19 0900          Bed Mobility Goal 1 (PT)    Activity/Assistive Device (Bed Mobility Goal 1, PT)  bed mobility activities, all  -JR     [place] Level/Cues  Needed (Bed Mobility Goal 1, PT)  conditional independence  -JR     Time Frame (Bed Mobility Goal 1, PT)  2 weeks  -JR     Row Name 11/09/19 0900          Transfer Goal 1 (PT)    Activity/Assistive Device (Transfer Goal 1, PT)  transfers, all  -JR     [place] Level/Cues Needed (Transfer Goal 1, PT)  conditional independence  -JR     Time Frame (Transfer Goal 1, PT)  2 weeks  -     Row Name 11/09/19 0900          Gait Training Goal 1 (PT)    Activity/Assistive Device (Gait Training Goal 1, PT)  gait (walking locomotion);assistive device use  -JR     [place] Level (Gait Training Goal 1, PT)  minimum assist (75% or more patient effort)  -JR     Distance (Gait Goal 1, PT)  100  -JR     Time Frame (Gait Training Goal 1, PT)  2 weeks  -     Row Name 11/09/19 0900          Positioning and Restraints    Pre-Treatment Position  in bed  -JR     Post Treatment Position  wheelchair  -JR     In Wheelchair  notified nsg;sitting;call light within reach;encouraged to call for assist;exit alarm on;with family/caregiver  -       User Key  (r) = Recorded By, (t) = Taken By, (c) = Cosigned By    Initials Name Provider Type    Viviana Glagsow, PT Physical Therapist        Physical Therapy Education     Title: PT OT SLP Therapies (Done)     Topic: Physical Therapy (Done)     Point: Mobility training (Done)     Learning Progress Summary           Patient Acceptance, E,TB, VU by  at 11/9/2019  9:48 AM                               User Key     Initials Effective Dates Name Provider Type Discipline     06/10/19 -  Viviana Gooden, MARC Physical Therapist PT              PT Recommendation and Plan  Anticipated Discharge Disposition (PT): inpatient rehabilitation facility  Planned Therapy Interventions (PT Eval): bed mobility training, gait training, home exercise program, patient/family education, strengthening, transfer training  Therapy Frequency (PT Clinical Impression): 3 times/wk  Outcome Summary/Treatment Plan  (PT)  Anticipated Discharge Disposition (PT): inpatient rehabilitation facility  Plan of Care Reviewed With: patient, daughter  Outcome Summary: patient hospitalized with weakness and history of falls.  She has been confused and family states slurred speech,   She lives at home alone with frequent and nearly 24 hour assist from family.  Family states she was ambulatory prior to hospitalization with rolling walker and brace on left foot for longstanding drop foot. She needs A for all mobiltiy and transfers for safety.  She is pleasant and oriented to self.  She responds well to commands and participates well in therapy .  She would benefit from IP rehab at [place] 2' to decline in functional status and living alone  Outcome Measures     Row Name 11/09/19 0900 11/09/19 0800          Modified [place] Scale    Modified [place] Scale  3 - Moderate disability.  Requiring some help, but able to walk without assistance.  -JR  --        Functional Assessment    Outcome Measure Options  --  Modified [place]  -KARI       User Key  (r) = Recorded By, (t) = Taken By, (c) = Cosigned By    Initials Name Provider Type    Viviana Glasgow PT Physical Therapist    Savannah Christopher, OT Occupational Therapist         Time Calculation:   PT Charges     Row Name 11/09/19 0953             Time Calculation    Start Time  0910  -JR      Stop Time  0940  -JR      Time Calculation (min)  30 min  -      PT Received On  11/09/19  -      PT - Next Appointment  11/11/19  -      PT Goal Re-Cert Due Date  11/23/19  -        User Key  (r) = Recorded By, (t) = Taken By, (c) = Cosigned By    Initials Name Provider Type    Viviana Glasgow, PT Physical Therapist        Therapy Charges for Today     Code Description Service Date Service Provider Modifiers Qty    63899476419  GAIT TRAINING EA 15 MIN 11/9/2019 Viviana Gooden, PT GP 1    [id number] HC PT EVAL HIGH COMPLEXITY 4 11/9/2019 Viviana Gooden, PT GP 1          PT G-Codes  Outcome  Measure Options: Modified [place]  Modified [place] Scale: 3 - Moderate disability.  Requiring some help, but able to walk without assistance.      Viviana Gooden, PT  2019          Electronically signed by Viviana Gooden, PT at 19 0959          Occupational Therapy Notes (last 24 hours) (Notes from 19 0844 through 11/10/19 0844)      Savannah Vazquez, OT at 19 1054          Acute Care - Occupational Therapy Initial Evaluation   [place]     Patient Name: Julianna Myles  : 10/7/1932  MRN: [id number]  Today's Date: 2019             Admit Date: 2019       ICD-10-CM ICD-9-CM   1. Weakness R53.1 780.79   2. Slurred speech R47.81 784.59   3. Fall, initial encounter W19.XXXA E888.9   4. Hypokalemia E87.6 276.8     Patient Active Problem List   Diagnosis   • Weakness     Past Medical History:   Diagnosis Date   • Arthritis    • Elevated cholesterol    • GERD (gastroesophageal reflux disease)      Past Surgical History:   Procedure Laterality Date   • BACK SURGERY            OT ASSESSMENT FLOWSHEET (last 12 hours)      Occupational Therapy Evaluation     Row Name 19 1044                   OT Evaluation Time/Intention    Document Type  evaluation  -KARI        Mode of Treatment  occupational therapy  -KARI           General Information    Patient Profile Reviewed?  yes  -KARI        Prior Level of Function  mod assist:;all household mobility;ADL's;home management recently needing more assist of fam d/t weakness, falls  -KARI        Equipment Currently Used at Home  walker, rolling transport chair, L AFO  -KARI        Pertinent History of Current Functional Problem  adm for weakness  -KARI        Existing Precautions/Restrictions  fall  -KARI           Relationship/Environment    Lives With  alone  -KARI           Bed Mobility Assessment/Treatment    Bed Mobility Assessment/Treatment  bed mobility (all) activities  -KARI        [place] Level (Bed Mobility)  moderate assist (50% patient effort)   -KARI           Functional Mobility    Functional Mobility- Ind. Level  minimum assist (75% patient effort);2 person assist required  -KARI        Functional Mobility- Comment  bilateral HHA  -KARI           Transfer Assessment/Treatment    Transfer Assessment/Treatment  sit-stand transfer;wheelchair transfer  -KARI           Sit-Stand Transfer    Sit-Stand [place] (Transfers)  minimum assist (75% patient effort)  -KARI           Wheelchair Transfer    Type (Wheelchair Transfer)  sit-stand  -KARI        [place] Level (Wheelchair Transfer)  minimum assist (75% patient effort);2 person assist  -KARI           ADL Assessment/Intervention    BADL Assessment/Intervention  lower body dressing  -KARI           Lower Body Dressing Assessment/Training    Lower Body Dressing [place] Level  moderate assist (50% patient effort)  -KARI        Lower Body Dressing Position  edge of bed sitting  -KARI        Comment (Lower Body Dressing)  has L AFO present in hospital  -KARI           General ROM    GENERAL ROM COMMENTS  BUE WFL  -KARI           MMT (Manual Muscle Testing)    General MMT Comments  BUE 3+/5  -KARI           Positioning and Restraints    Pre-Treatment Position  in bed  -KARI        Post Treatment Position  wheelchair  -KARI        In Wheelchair  sitting;call light within reach;encouraged to call for assist;with family/caregiver  -KARI           Pain Scale: Numbers Pre/Post-Treatment    Pain Scale: Numbers, Pretreatment  0/10 - no pain  -KARI        Pain Scale: Numbers, Post-Treatment  0/10 - no pain  -KARI           Clinical Impression (OT)    Criteria for Skilled Therapeutic Interventions Met (OT Eval)  yes  -KARI        Therapy Frequency (OT Eval)  3 times/wk  -KARI        Care Plan Review, Other Participant (OT Eval)  daughter  -KARI        Anticipated Discharge Disposition (OT)  inpatient rehabilitation facility  -           OT Goals    Transfer Goal Selection (OT)  transfer, OT goal 1  -KARI        Dressing Goal Selection (OT)  dressing,  OT goal 1  -KARI        Toileting Goal Selection (OT)  toileting, OT goal 1  -KARI           Transfer Goal 1 (OT)    Activity/Assistive Device (Transfer Goal 1, OT)  transfers, all  -KARI        [place] Level/Cues Needed (Transfer Goal 1, OT)  supervision required  -KARI        Time Frame (Transfer Goal 1, OT)  2 weeks  -KARI           Dressing Goal 1 (OT)    Activity/Assistive Device (Dressing Goal 1, OT)  dressing skills, all  -KARI        [place]/Cues Needed (Dressing Goal 1, OT)  conditional independence  -KARI        Time Frame (Dressing Goal 1, OT)  2 weeks  -KARI           Toileting Goal 1 (OT)    Activity/Device (Toileting Goal 1, OT)  toileting skills, all  -KARI        [place] Level/Cues Needed (Toileting Goal 1, OT)  conditional independence  -KARI        Time Frame (Toileting Goal 1, OT)  2 weeks  -KARI          User Key  (r) = Recorded By, (t) = Taken By, (c) = Cosigned By    Initials Name Effective Dates    Savannah Christopher, OT 07/25/19 -          Occupational Therapy Education     Title: PT OT SLP Therapies (Done)     Topic: Occupational Therapy (Done)     Point: ADL training (Done)     Description: Instruct learner(s) on proper safety adaptation and remediation techniques during self care or transfers.   Instruct in proper use of assistive devices.    Learning Progress Summary           Patient Acceptance, E,TB, VU by KARI at 11/9/2019 10:49 AM   Family Acceptance, E,TB, VU by KARI at 11/9/2019 10:49 AM                   Point: Home exercise program (Done)     Description: Instruct learner(s) on appropriate technique for monitoring, assisting and/or progressing therapeutic exercises/activities.    Learning Progress Summary           Patient Acceptance, E,TB, VU by KARI at 11/9/2019 10:49 AM   Family Acceptance, E,TB, VU by KARI at 11/9/2019 10:49 AM                   Point: Precautions (Done)     Description: Instruct learner(s) on prescribed precautions during self-care and functional transfers.    Learning  Progress Summary           Patient Acceptance, E,TB, VU by  at 11/9/2019 10:49 AM   Family Acceptance, E,TB, VU by  at 11/9/2019 10:49 AM                   Point: Body mechanics (Done)     Description: Instruct learner(s) on proper positioning and spine alignment during self-care, functional mobility activities and/or exercises.    Learning Progress Summary           Patient Acceptance, E,TB, VU by  at 11/9/2019 10:49 AM   Family Acceptance, E,TB, VU by  at 11/9/2019 10:49 AM                               User Key     Initials Effective Dates Name Provider Type Discipline     07/25/19 -  Savannah Vazquez OT Occupational Therapist OT                  OT Recommendation and Plan  Outcome Summary/Treatment Plan (OT)  Anticipated Discharge Disposition (OT): inpatient rehabilitation facility  Therapy Frequency (OT Eval): 3 times/wk  Plan of Care Review  Plan of Care Reviewed With: patient, daughter  Plan of Care Reviewed With: patient, daughter  Outcome Summary: 87 y.o female adm with weakness and recent fall. Of note, pt has L foot drop and does have L AFO in hospital. Pt has been confused, with episode of slurred speech. Per dtr, pt has has previous episodes similiar to this, but then returns to normal function. Pt resides alone and was previously independent up until recently, but fam very involved and able to provide near 24 hr supervision. Pt not curerntly at high baseline function, and would benefit from an IP Rehab stay to address deficits.     Outcome Measures     Row Name 11/09/19 1053 11/09/19 0900 11/09/19 0800       Modified [place] Scale    Pre-Stroke Modified Lily Scale  4 - Moderately severe disability.  Unable to walk without assistance, and unable to attend to own bodily needs without assistance.  -KARI  --  --    Modified [place] Scale  4 - Moderately severe disability.  Unable to walk without assistance, and unable to attend to own bodily needs without assistance.  -KARI  3 - Moderate  disability.  Requiring some help, but able to walk without assistance.  -JR  --       Functional Assessment    Outcome Measure Options  Modified [place]  -KARI  --  Modified [place]  -KARI      User Key  (r) = Recorded By, (t) = Taken By, (c) = Cosigned By    Initials Name Provider Type     GiacomoViviana, PT Physical Therapist    Savannah Christopher OT Occupational Therapist          Time Calculation:   Time Calculation- OT     Row Name 11/09/19 1053             Time Calculation- OT    OT Start Time  0906  -KARI      OT Stop Time  0920  -KARI      OT Time Calculation (min)  14 min  -KARI      Total Timed Code Minutes- OT  0 minute(s)  -KARI      OT Received On  11/09/19  -KARI      OT - Next Appointment  11/11/19  -KARI      OT Goal Re-Cert Due Date  11/23/19  -KARI        User Key  (r) = Recorded By, (t) = Taken By, (c) = Cosigned By    Initials Name Provider Type    Savannah Christopher OT Occupational Therapist        Therapy Charges for Today     Code Description Service Date Service Provider Modifiers Qty    [id number]  OT EVAL MOD COMPLEXITY 4 11/9/2019 Savannah Vazquez OT GO 1               Savannah Vazquez OT  11/9/2019    Electronically signed by Savannah Vazquez OT at 11/09/19 1054     Savannah Vazquez OT at 11/09/19 1052          Problem: Patient Care Overview  Goal: Plan of Care Review   11/09/19 1049   Coping/Psychosocial   Plan of Care Reviewed With patient;daughter   OTHER   Outcome Summary 87 y.o female adm with weakness and recent fall. Of note, pt has L foot drop and does have L AFO in hospital. Pt has been confused, with episode of slurred speech. Per dtr, pt has  previous episodes similiar to this, but then returns to normal function. Pt resides alone and was previously independent up until recently, but fam very involved and able to provide near 24 hr supervision. Pt not currently at high baseline function, and would benefit from an IP Rehab stay to address deficits.             Electronically signed by Savannah Vazquez OT at 11/09/19 5928

## 2019-11-10 NOTE — PLAN OF CARE
Problem: Patient Care Overview  Goal: Plan of Care Review  Outcome: Ongoing (interventions implemented as appropriate)      Problem: Fall Risk (Adult)  Goal: Identify Related Risk Factors and Signs and Symptoms  Outcome: Ongoing (interventions implemented as appropriate)      Problem: Skin Injury Risk (Adult)  Goal: Identify Related Risk Factors and Signs and Symptoms  Outcome: Ongoing (interventions implemented as appropriate)      Problem: Activity Intolerance (Adult)  Goal: Identify Related Risk Factors and Signs and Symptoms  Outcome: Ongoing (interventions implemented as appropriate)    Goal: Effective Energy Conservation Techniques  Outcome: Ongoing (interventions implemented as appropriate)

## 2019-11-10 NOTE — CONSULTS
"Adult Nutrition  Assessment/PES    Patient Name:  Julianna Myles  YOB: 1932  MRN: [id number]  Admit Date:  11/8/2019    Assessment Date:  11/10/2019    Comments:  Ordering Boost BID which provides 720 kcals, 28 gm protein    Reason for Assessment     Row Name 11/10/19 1507          Reason for Assessment    Reason For Assessment MST score 2/Nursing Admission Screen       Diagnosis H&P: Arthritis, GERD     Current Problems:   1. Weakness  2. Slurred speech-. 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 Elevated Chol-LDL   5. [place]         Nutrition/Diet History     Row Name 11/10/19 1507          Nutrition/Diet History    Typical Food/Fluid Intake  Attempted to visit pt who using bathroom. Will follow up at later date       Food Allergies NKFA         Anthropometrics     Row Name 11/10/19 1503       Anthropometrics    Height  152.4 cm (60\")    Weight  53.3 kg (117 lb 8.1 oz)    11/10/19        Admit Weight    Admit Weight  55.1 kg (121 lb 7.6 oz)    11/8/19        Ideal Body Weight (IBW)    Ideal Body Weight (IBW) (kg)  45.86    % Ideal Body Weight  116.22       Usual Body Weight (UBW)    Usual Body Weight Unable to determine    % of Usual Body Weight Assessment Unable to determine    Weight Hx  130# (2/22/18)    Noted wt loss trend        Body Mass Index (BMI)    BMI (kg/m2)  23        Labs/Tests/Procedures/Meds     Row Name 11/10/19 1509          Labs/Procedures/Meds    Lab Results Comments  BUN 28 H, Ca 8.4 L, Alb 3.4 L, Hgb 9.7 L, Hct 28.6 L         Medications    Pertinent Medications Comments  Rocephin, Neurontin, protonix, KDUR         Physical Findings     Row Name 11/10/19 1510          Physical Findings    Overall Physical Appearance Unable to see pt at visit     Gastrointestinal + BM today     Tubes  None at this time     Oral/Mouth Cavity Unknown chewing or swallowing issues      Skin Intact         Estimated/Assessed Needs     " Row Name 11/10/19 1510 11/10/19 1508       Calculation Measurements    Weight Used For Calculations      Height         KCAL/KG    KCAL/KG               Protein Requirements    Weight Used For Protein Calculations      Est Protein Requirement Amount (gms/kg)      Estimated Protein Requirements (gms/day)         Fluid Requirements    Estimated Fluid Requirements (mL/day)          Nutrition Prescription Ordered     Row Name 11/10/19 1511          Nutrition Prescription PO    Current PO Diet Healthy Heart     Supplement  -- -     Supplement Frequency  -- -        Nutrition Prescription EN    Enteral Route  -- -        Nutrition Prescription PN    PN Route  -- -         Evaluation of Received Nutrient/Fluid Intake     Row Name 11/10/19 1511          PO Evaluation    % PO Intake  100% x 1 meal recorded         EN Evaluation    TF Changes  -- -     TF Residual  -- -     TF Tolerance  -- -        PN Evaluation    Number of Days PN Evaluated  -- -           Problem/Interventions:  Problem 1     Row Name 11/10/19 1511          Nutrition Diagnoses Problem 1    Problem 1 Inadequate oral intakes r/t decreased ability to consume adequate PO AEB gradual wt loss per EMR           Intervention Goal     Row Name 11/10/19 1512          Intervention Goal    General Pt will eat >65% of meals and accept ONS 50% of the time         Nutrition Intervention     Row Name 11/10/19 1512          Nutrition Intervention    RD/Tech Action Starting ONS and liberalizing diet          Nutrition Prescription     Row Name 11/10/19 1512          Nutrition Prescription PO    Begin/Change Diet to  Regular     Supplement  Boost Plus     Supplement Frequency  2 times a day        Nutrition Prescription EN    Enteral Prescription  -- -        Nutrition Prescription PN    Parenteral Prescription  -- -         Education/Evaluation     Row Name 11/10/19 1512          Monitor/Evaluation    Monitor  I&O;PO intake;Supplement intake;Pertinent labs;Weight;Skin  status;Symptoms           Electronically signed by:  Mary Orta RD  11/10/19 3:13 PM

## 2019-11-10 NOTE — PLAN OF CARE
Problem: Fall Risk (Adult)  Goal: Absence of Fall  Outcome: Ongoing (interventions implemented as appropriate)   11/10/19 1302   Fall Risk (Adult)   Absence of Fall making progress toward outcome       Problem: Skin Injury Risk (Adult)  Goal: Identify Related Risk Factors and Signs and Symptoms  Outcome: Ongoing (interventions implemented as appropriate)   11/10/19 1302   Skin Injury Risk (Adult)   Related Risk Factors (Skin Injury Risk) advanced age;infection;mobility impaired      11/10/19 1302   Skin Injury Risk (Adult)   Related Risk Factors (Skin Injury Risk) advanced age;infection;mobility impaired       Problem: Activity Intolerance (Adult)  Goal: Activity Tolerance  Outcome: Ongoing (interventions implemented as appropriate)   11/10/19 1302   Activity Intolerance (Adult)   Activity Tolerance making progress toward outcome

## 2019-11-11 PROBLEM — I67.2 INTRACRANIAL ATHEROSCLEROSIS: Status: ACTIVE | Noted: 2019-11-11

## 2019-11-11 LAB
ALBUMIN SERPL-MCNC: 3.4 G/DL (ref 3.5–5.2)
ALBUMIN/GLOB SERPL: 1.2 G/DL
ALP SERPL-CCNC: 45 U/L (ref 39–117)
ALT SERPL W P-5'-P-CCNC: 14 U/L (ref 1–33)
ANION GAP SERPL CALCULATED.3IONS-SCNC: 11 MMOL/L (ref 5–15)
AST SERPL-CCNC: 22 U/L (ref 1–32)
BASOPHILS # BLD AUTO: 0 10*3/MM3 (ref 0–0.2)
BASOPHILS NFR BLD AUTO: 0.8 % (ref 0–1.5)
BILIRUB SERPL-MCNC: 0.2 MG/DL (ref 0.2–1.2)
BUN BLD-MCNC: 11 MG/DL (ref 8–23)
BUN/CREAT SERPL: 20 (ref 7–25)
CALCIUM SPEC-SCNC: 8.4 MG/DL (ref 8.6–10.5)
CHLORIDE SERPL-SCNC: 107 MMOL/L (ref 98–107)
CO2 SERPL-SCNC: 22 MMOL/L (ref 22–29)
CREAT BLD-MCNC: 0.55 MG/DL (ref 0.57–1)
DEPRECATED RDW RBC AUTO: 49.4 FL (ref 37–54)
EOSINOPHIL # BLD AUTO: 0.1 10*3/MM3 (ref 0–0.4)
EOSINOPHIL NFR BLD AUTO: 3.1 % (ref 0.3–6.2)
ERYTHROCYTE [DISTWIDTH] IN BLOOD BY AUTOMATED COUNT: 13.5 % (ref 12.3–15.4)
GFR SERPL CREATININE-BSD FRML MDRD: 105 ML/MIN/1.73
GLOBULIN UR ELPH-MCNC: 2.8 GM/DL
GLUCOSE BLD-MCNC: 103 MG/DL (ref 65–99)
HBA1C MFR BLD: 4.7 % (ref 3.5–5.6)
HCT VFR BLD AUTO: 28.5 % (ref 34–46.6)
HGB BLD-MCNC: 9.6 G/DL (ref 12–15.9)
LYMPHOCYTES # BLD AUTO: 0.7 10*3/MM3 (ref 0.7–3.1)
LYMPHOCYTES NFR BLD AUTO: 17.5 % (ref 19.6–45.3)
MCH RBC QN AUTO: 35.2 PG (ref 26.6–33)
MCHC RBC AUTO-ENTMCNC: 33.6 G/DL (ref 31.5–35.7)
MCV RBC AUTO: 104.7 FL (ref 79–97)
MONOCYTES # BLD AUTO: 0.4 10*3/MM3 (ref 0.1–0.9)
MONOCYTES NFR BLD AUTO: 10.5 % (ref 5–12)
NEUTROPHILS # BLD AUTO: 2.9 10*3/MM3 (ref 1.7–7)
NEUTROPHILS NFR BLD AUTO: 68.1 % (ref 42.7–76)
NRBC BLD AUTO-RTO: 0 /100 WBC (ref 0–0.2)
PLATELET # BLD AUTO: 168 10*3/MM3 (ref 140–450)
PMV BLD AUTO: 8.7 FL (ref 6–12)
POTASSIUM BLD-SCNC: 4 MMOL/L (ref 3.5–5.2)
PROT SERPL-MCNC: 6.2 G/DL (ref 6–8.5)
RBC # BLD AUTO: 2.73 10*6/MM3 (ref 3.77–5.28)
SODIUM BLD-SCNC: 140 MMOL/L (ref 136–145)
WBC NRBC COR # BLD: 4.2 10*3/MM3 (ref 3.4–10.8)

## 2019-11-11 PROCEDURE — 97530 THERAPEUTIC ACTIVITIES: CPT

## 2019-11-11 PROCEDURE — 80053 COMPREHEN METABOLIC PANEL: CPT | Performed by: NURSE PRACTITIONER

## 2019-11-11 PROCEDURE — 99222 1ST HOSP IP/OBS MODERATE 55: CPT | Performed by: NURSE PRACTITIONER

## 2019-11-11 PROCEDURE — 25010000002 CEFTRIAXONE PER 250 MG: Performed by: NURSE PRACTITIONER

## 2019-11-11 PROCEDURE — 97535 SELF CARE MNGMENT TRAINING: CPT

## 2019-11-11 PROCEDURE — 85025 COMPLETE CBC W/AUTO DIFF WBC: CPT | Performed by: NURSE PRACTITIONER

## 2019-11-11 PROCEDURE — 97116 GAIT TRAINING THERAPY: CPT

## 2019-11-11 RX ORDER — LANOLIN ALCOHOL/MO/W.PET/CERES
1000 CREAM (GRAM) TOPICAL DAILY
Status: DISCONTINUED | OUTPATIENT
Start: 2019-11-11 | End: 2019-11-13 | Stop reason: HOSPADM

## 2019-11-11 RX ORDER — ASPIRIN 81 MG/1
81 TABLET, CHEWABLE ORAL DAILY
Status: DISCONTINUED | OUTPATIENT
Start: 2019-11-11 | End: 2019-11-13 | Stop reason: HOSPADM

## 2019-11-11 RX ADMIN — ATORVASTATIN CALCIUM 20 MG: 20 TABLET, FILM COATED ORAL at 20:53

## 2019-11-11 RX ADMIN — IBUPROFEN 800 MG: 400 TABLET ORAL at 05:44

## 2019-11-11 RX ADMIN — POTASSIUM CHLORIDE 20 MEQ: 1500 TABLET, EXTENDED RELEASE ORAL at 10:21

## 2019-11-11 RX ADMIN — IBUPROFEN 800 MG: 400 TABLET ORAL at 21:00

## 2019-11-11 RX ADMIN — HYDRALAZINE HYDROCHLORIDE 100 MG: 25 TABLET, FILM COATED ORAL at 10:21

## 2019-11-11 RX ADMIN — CARBAMAZEPINE 200 MG: 200 TABLET ORAL at 17:19

## 2019-11-11 RX ADMIN — Medication 10 ML: at 10:22

## 2019-11-11 RX ADMIN — HYDRALAZINE HYDROCHLORIDE 100 MG: 25 TABLET, FILM COATED ORAL at 20:53

## 2019-11-11 RX ADMIN — ZONISAMIDE 100 MG: 100 CAPSULE ORAL at 01:29

## 2019-11-11 RX ADMIN — CARBAMAZEPINE 200 MG: 200 TABLET ORAL at 23:38

## 2019-11-11 RX ADMIN — ZONISAMIDE 100 MG: 100 CAPSULE ORAL at 23:39

## 2019-11-11 RX ADMIN — CARBAMAZEPINE 200 MG: 200 TABLET ORAL at 14:18

## 2019-11-11 RX ADMIN — PANTOPRAZOLE SODIUM 40 MG: 40 TABLET, DELAYED RELEASE ORAL at 10:21

## 2019-11-11 RX ADMIN — CARBAMAZEPINE 200 MG: 200 TABLET ORAL at 05:44

## 2019-11-11 RX ADMIN — GABAPENTIN 100 MG: 100 CAPSULE ORAL at 05:43

## 2019-11-11 RX ADMIN — ZONISAMIDE 100 MG: 100 CAPSULE ORAL at 05:44

## 2019-11-11 RX ADMIN — CYANOCOBALAMIN TAB 1000 MCG 1000 MCG: 1000 TAB at 17:19

## 2019-11-11 RX ADMIN — CEFTRIAXONE SODIUM 1 G: 1 INJECTION, POWDER, FOR SOLUTION INTRAMUSCULAR; INTRAVENOUS at 17:18

## 2019-11-11 RX ADMIN — CARBAMAZEPINE 200 MG: 200 TABLET ORAL at 01:28

## 2019-11-11 RX ADMIN — ASPIRIN 81 MG 81 MG: 81 TABLET ORAL at 14:18

## 2019-11-11 RX ADMIN — Medication 10 ML: at 20:53

## 2019-11-11 NOTE — PLAN OF CARE
Problem: Patient Care Overview  Goal: Plan of Care Review  Outcome: Ongoing (interventions implemented as appropriate)   11/11/19 7464   Coping/Psychosocial   Plan of Care Reviewed With patient   OTHER   Outcome Summary Pt demonstrated signifciant improvement in mobility/activity tolerance this session, progressing to gait training 100ft with RW requiring CGA and required CGA for transfers/static standing bout. Pt completed 30 Second Sit to/from Stand Test for 6 reps with vitals stable/WNL on RA throughout session. Pt continues to demonstrate impaired gait mechanics with L foot drop and inadequate bracing at this time (would benefit from custom AFO for improved toe clearance) and requires VC'ing for safety of navigation of RW. Recommending short IP rehab stay at d/c, as pt is not safe for home alone and remains increased risk for falls. Continue seeing 3x/week at [facility] for progression of mobility.   Plan of Care Review   Progress improving

## 2019-11-11 NOTE — CONSULTS
Primary Care Provider: Yessy Spears MD     Consult requested by: Dr. Spears    Reason for Consultation: Neurological evaluation    History taken from: patient chart RN    Chief complaint: Proximal muscle weakness       SUBJECTIVE:    History of present illness: Background per H&P: 87-year-old female presented to ER yesterday with a one-week history of intermittent slurred speech, weakness of the left lower extremity.  Family at bedside reports that she lives alone and that they check in on her daily.  When patient had initial episode 1 week ago she followed up with her PCP, Dr. Yessy Spears who checked labs which were told all normal.  Her son reported that she was found on the floor prior day but denies any injury.  Patient is alert to self and place and family at bedside, not time. She denies all c/o pain, illness, N/V, diarrhea currently but did vomit once 2 days ago. Family states episodes of slurred speech last a couple of hours and then improve.    Pt clarifies that she had bilateral leg weakness and felt like she was going to fall so she lowered herself to the ground. She denies falling or losing consciousness. She had difficulty getting up so she crawled over to her bed and pulled herself up. She has reportedly had 3 episodes of dysarthria and confusion over the past week. She may have had a left facial droop, but family now denies seeing that.     At this time, pt states she feels well and denies complaint.     Review of Systems   Constitutional: Negative.    Neurological: Negative.           PATIENT HISTORY:  Past Medical History:   Diagnosis Date   • Acute exacerbation of chronic obstructive pulmonary disease (COPD) (CMS/[facility]) 2/1/2013   • Arthritis    • Elevated cholesterol    • GERD (gastroesophageal reflux disease)    ,   Past Surgical History:   Procedure Laterality Date   • BACK SURGERY     ,   Family History   Problem Relation Age of Onset   • Heart disease Father    ,   Social History      Tobacco Use   • Smoking status: Never Smoker   • Smokeless tobacco: Never Used   Substance Use Topics   • Alcohol use: No     Frequency: Never   • Drug use: No   ,   Medications Prior to Admission   Medication Sig Dispense Refill Last Dose   • carBAMazepine (TEGretol) 200 MG tablet Take 200 mg by mouth 4 (Four) Times a Day.   11/8/2019 at 12:00   • Flaxseed, Linseed, (FLAX SEED OIL) 1000 MG capsule Take 1 capsule by mouth Daily.   11/8/2019 at Unknown time   • gabapentin (NEURONTIN) 300 MG capsule Take 300 mg by mouth 3 (Three) Times a Day.   11/8/2019 at 12:00   • hydrALAZINE (APRESOLINE) 100 MG tablet Take 100 mg by mouth 2 (Two) Times a Day.   11/8/2019 at 12:00   • omeprazole (priLOSEC) 20 MG capsule Take 20 mg by mouth Daily.   11/8/2019 at Unknown time   • oxaprozin (DAYPRO) 600 MG tablet Take 600 mg by mouth 2 (Two) Times a Day.      • zonisamide (ZONEGRAN) 100 MG capsule Take 100 mg by mouth 4 (Four) Times a Day.   11/8/2019 at 12:00   , Scheduled Meds:      aspirin 81 mg Oral Daily   atorvastatin 20 mg Oral Nightly   carBAMazepine 200 mg Oral Q6H   cefTRIAXone 1 g Intravenous Q24H   hydrALAZINE 100 mg Oral Q12H   ibuprofen 800 mg Oral Q8H   pantoprazole 40 mg Oral QAM   potassium chloride 20 mEq Oral Daily   sodium chloride 10 mL Intravenous Q12H   vitamin B-12 1,000 mcg Oral Daily   zonisamide 100 mg Oral Q6H   , Continuous Infusions:   , PRN Meds:  influenza vaccine  •  sodium chloride  •  sodium chloride, Allergies:  Banana    ________________________________________________________        OBJECTIVE:  Upon today's exam, pt is awake and alert.          Neurologic Exam  PHYSICAL EXAM:    Constitutional: The patient is in no apparent distress, bright awake and alert. There is no shortness of breath.     HEENT: There is no tenderness over the temporal arteries bilaterally. Normocephalic, atraumatic. TMJ open symmetrically without tenderness.    Chest: Breathing unlabored    Cardiac: Regular rate and  rhythm.     Extremities:  No clubbing, cyanosis or edema.    NEUROLOGICAL:    Cognition:   Fully oriented.  Fund of knowledge excellent.  Concentration and attention normal.   Language normal with normal comprehension, fluent speech, intact repetition and naming.   Short and long term memory appears intact    Cranial nerves;    II - pupils bilaterally equal reacting to light,  No new Visual field deficits;  Fundoscopic exam- Not able to be done, non-dilated exam  III,IV,VI: EOMI with no diplopia  V: Normal facial sensations  VII: Slight lower left facial asymmetry, resolves when smiling  VIII: No New hearing abnormality  IX, X, XI: normal gag and shoulder shrug;  XII: tongue is in the midline.    Sensory:  Intact to light touch in all extremities.     Motor: Strength: RUE 5/5, LUE 5/5, RLE 5-/5, LLE proximal 5-/5 distal 1/4 with chronic foot drop. No involuntary movements present. Normal tone and bulk.  Deep tendon reflexes: 0/4    Cerebellar: Finger to nose and mirror movements normal bilaterally.    Gait and balance: deferred (limited mobility at baseline)    ________________________________________________________   RESULTS REVIEW:    VITAL SIGNS:   Temp:  [97.9 °F (36.6 °C)-98.4 °F (36.9 °C)] 97.9 °F (36.6 °C)  Heart Rate:  [64-77] 68  Resp:  [11-22] 11  BP: (141-185)/(54-80) 147/74     LABS:  WBC   Date Value Ref Range Status   11/11/2019 4.20 3.40 - 10.80 10*3/mm3 Final     RBC   Date Value Ref Range Status   11/11/2019 2.73 (L) 3.77 - 5.28 10*6/mm3 Final     Hemoglobin   Date Value Ref Range Status   11/11/2019 9.6 (L) 12.0 - 15.9 g/dL Final     Hematocrit   Date Value Ref Range Status   11/11/2019 28.5 (L) 34.0 - 46.6 % Final     MCV   Date Value Ref Range Status   11/11/2019 104.7 (H) 79.0 - 97.0 fL Final     MCH   Date Value Ref Range Status   11/11/2019 35.2 (H) 26.6 - 33.0 pg Final     MCHC   Date Value Ref Range Status   11/11/2019 33.6 31.5 - 35.7 g/dL Final     RDW   Date Value Ref Range Status    11/11/2019 13.5 12.3 - 15.4 % Final     RDW-SD   Date Value Ref Range Status   11/11/2019 49.4 37.0 - 54.0 fl Final     MPV   Date Value Ref Range Status   11/11/2019 8.7 6.0 - 12.0 fL Final     Platelets   Date Value Ref Range Status   11/11/2019 168 140 - 450 10*3/mm3 Final     Neutrophil %   Date Value Ref Range Status   11/11/2019 68.1 42.7 - 76.0 % Final     Lymphocyte %   Date Value Ref Range Status   11/11/2019 17.5 (L) 19.6 - 45.3 % Final     Monocyte %   Date Value Ref Range Status   11/11/2019 10.5 5.0 - 12.0 % Final     Eosinophil %   Date Value Ref Range Status   11/11/2019 3.1 0.3 - 6.2 % Final     Basophil %   Date Value Ref Range Status   11/11/2019 0.8 0.0 - 1.5 % Final     Neutrophils, Absolute   Date Value Ref Range Status   11/11/2019 2.90 1.70 - 7.00 10*3/mm3 Final     Lymphocytes, Absolute   Date Value Ref Range Status   11/11/2019 0.70 0.70 - 3.10 10*3/mm3 Final     Monocytes, Absolute   Date Value Ref Range Status   11/11/2019 0.40 0.10 - 0.90 10*3/mm3 Final     Eosinophils, Absolute   Date Value Ref Range Status   11/11/2019 0.10 0.00 - 0.40 10*3/mm3 Final     Basophils, Absolute   Date Value Ref Range Status   11/11/2019 0.00 0.00 - 0.20 10*3/mm3 Final     nRBC   Date Value Ref Range Status   11/11/2019 0.0 0.0 - 0.2 /100 WBC Final     Glucose   Date Value Ref Range Status   11/11/2019 103 (H) 65 - 99 mg/dL Final     BUN   Date Value Ref Range Status   11/11/2019 11 8 - 23 mg/dL Final     Creatinine   Date Value Ref Range Status   11/11/2019 0.55 (L) 0.57 - 1.00 mg/dL Final     Sodium   Date Value Ref Range Status   11/11/2019 140 136 - 145 mmol/L Final     Potassium   Date Value Ref Range Status   11/11/2019 4.0 3.5 - 5.2 mmol/L Final     Chloride   Date Value Ref Range Status   11/11/2019 107 98 - 107 mmol/L Final     CO2   Date Value Ref Range Status   11/11/2019 22.0 22.0 - 29.0 mmol/L Final     Calcium   Date Value Ref Range Status   11/11/2019 8.4 (L) 8.6 - 10.5 mg/dL Final     Total  Protein   Date Value Ref Range Status   11/11/2019 6.2 6.0 - 8.5 g/dL Final     Albumin   Date Value Ref Range Status   11/11/2019 3.40 (L) 3.50 - 5.20 g/dL Final     ALT (SGPT)   Date Value Ref Range Status   11/11/2019 14 1 - 33 U/L Final     AST (SGOT)   Date Value Ref Range Status   11/11/2019 22 1 - 32 U/L Final     Alkaline Phosphatase   Date Value Ref Range Status   11/11/2019 45 39 - 117 U/L Final     Total Bilirubin   Date Value Ref Range Status   11/11/2019 0.2 0.2 - 1.2 mg/dL Final     eGFR Non  Amer   Date Value Ref Range Status   11/11/2019 105 >60 mL/min/1.73 Final     BUN/Creatinine Ratio   Date Value Ref Range Status   11/11/2019 20.0 7.0 - 25.0 Final     Anion Gap   Date Value Ref Range Status   11/11/2019 11.0 5.0 - 15.0 mmol/L Final       Lab Results   Component Value Date    TSH 1.090 11/09/2019     (H) 11/09/2019    HGBA1C 4.7 11/09/2019    [id number] 286 11/09/2019         IMAGING STUDIES:  Ct Angiogram Carotids    Result Date: 11/11/2019   1. 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 2. Right P2 segment returns to normal caliber without evidence of aneurysm. 3. Incompletely imaged ground glass irregular opacities within the lung apices.  Findings likely reflect infectious or inflammatory etiology.  Electronically Signed ByLulu Zaldivar On:11/11/2019 12:10 PM This report was finalized on [id number] by  Job Zaldivar .    Ct Angiogram Head    Result Date: 11/11/2019   1. 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 2. Right P2 segment returns to normal caliber without evidence of aneurysm. 3. Incompletely imaged ground glass irregular opacities within the lung apices.  Findings likely reflect infectious or inflammatory etiology.  Electronically Signed ByLulu Zaldivar On:11/11/2019 12:10 PM This report was finalized on [id number] by  Job Zaldivar, .      I reviewed the patient's new clinical  results.      ________________________________________________________     PROBLEM LIST:    Weakness    Intracranial atherosclerosis          Assessment/Plan   ASSESSMENT/PLAN:  1. Intermittent dysarthria and bilateral leg wekness (symptoms present for 1 week). Possibly encephalopathy and generalized weakness due to underlying pneumonia and questionable UTI. No acute stroke. No loss of consciousness or syncope. No witnessed seizure activity. A focal seizure cannot be ruled out.  - CT head: No acute changes. Chronic small vessel ischemic disease and atrophy  - MRI brain: No acute changes. Chronic small vessel ischemic disease. Possible mastoiditis.   - MRA head & neck: No significant carotid stenosis. High-grade stenosis at the right P1/P2 junction with possible 2 mm aneurysm at the proximal right P2 segment  - EEG pending  - Echo: Left ventricular systolic function is normal. There is calcification of the aortic valve. Mild mitral valve regurgitation is present. Mild tricuspid valve regurgitation is present. LV ejection fraction is about 65 to 70% .No pericardial effusion noted  - EKG: Sinus rhythm. Ventricular premature complex. Anterolateral infarct, old  - Labs: A1C: 4.7, B12: 286, LDL: 199, TSH: 1.09  - Antithrombotics: ASA 81mg daily   - Statin: Lipitor 20mg qhs  - PT/OT/ST as appropriate, Neuro checks per protocol, DVT prophylaxis, Stroke education    2. Right PCA stenosis  - CTA head and neck: High-grade stenosis of the right P1 segment throughout its course. Right P2 segment returns to normal caliber without evidence of aneurysm. Incompletely imaged ground glass irregular opacities within the lung apices.  Findings likely reflect infectious or inflammatory etiology  - ASA and statin    3. Abnormal urinalysis  - antibiotics per primary    4. Possible pneumonia per CXR  - Management per primary    5. Arthralgia  - Ibuprofen per primary, consider decreasing dose due to risk of GI irritation now that ASA has  been added.     6. HLD  - Statin, recommend diet and lifestyle modifications    7. B12 Deficiency  - Replacement ordered  - Recommend cyanocobalamin 1000mcg PO daily or IM monthly    8. Modification of stroke risk factors:   - Blood pressure should be less than 130/80 outpatient, HbA1c less than 6.5, LDL less than 70; b12>500 and smoking cessation if applicable. We would be grateful if the primary team / primary care physician keep a close watch on this above targets.  - Stroke education  - Follow up with neurologist of choice      I discussed the patients findings and my recommendations with patient, family, nursing staff and consulting provider    Monica Harris, RANDOLPH  11/11/19  4:50 PM

## 2019-11-11 NOTE — PLAN OF CARE
Problem: Patient Care Overview  Goal: Plan of Care Review  Outcome: Ongoing (interventions implemented as appropriate)   11/11/19 8239   Coping/Psychosocial   Plan of Care Reviewed With patient   OTHER   Outcome Summary Pt. demonstrates improved functional mobility this date and completes toileting activity w/ setup/supervision assist, albeit progress limited secondary to min VC's required for walker orientation and min A for balance during dynamic standing activity. Recommend IP rehab at d/c to address aforementioned deficits and mitigate falls risk. Will follow up w/ pt. 1-3x per week at [facility].    Plan of Care Review   Progress improving

## 2019-11-11 NOTE — THERAPY TREATMENT NOTE
Acute Care - Occupational Therapy Treatment Note  [facility]     Patient Name: Julianna Myles  : 10/7/1932  MRN: [id number]  Today's Date: 2019             Admit Date: 2019       ICD-10-CM ICD-9-CM   1. Weakness R53.1 780.79   2. Slurred speech R47.81 784.59   3. Fall, initial encounter W19.XXXA E888.9   4. Hypokalemia E87.6 276.8     Patient Active Problem List   Diagnosis   • Weakness   • Acute exacerbation of chronic obstructive pulmonary disease (COPD) (CMS/[facility])   • Common peroneal neuropathy   • Degeneration of lumbar or lumbosacral intervertebral disc   • Lumbar radiculopathy   • Spinal stenosis of lumbar region     Past Medical History:   Diagnosis Date   • Acute exacerbation of chronic obstructive pulmonary disease (COPD) (CMS/[facility]) 2013   • Arthritis    • Elevated cholesterol    • GERD (gastroesophageal reflux disease)      Past Surgical History:   Procedure Laterality Date   • BACK SURGERY         Therapy Treatment    Rehabilitation Treatment Summary     Row Name 19 1600             Treatment Time/Intention    Discipline  occupational therapist  -MP      Document Type  therapy note (daily note)  -MP      Mode of Treatment  occupational therapy  -MP      Recorded by [MP] Ok Jara OT 19 1631      Row Name 19 1600             Bed Mobility Assessment/Treatment    Bed Mobility Assessment/Treatment  supine-sit;sit-supine  -MP      [place] Level (Bed Mobility)  contact guard assist;1 person assist  -MP      Supine-Sit [place] (Bed Mobility)  contact guard;1 person assist  -MP      Recorded by [MP] Ok Jara OT 19 1631      Row Name 19 1600             Functional Mobility    Functional Mobility- Ind. Level  minimum assist (75% patient effort);1 person  -MP      Recorded by [MP] Ok Jara OT 19 1631      Row Name 19 1600             Transfer Assessment/Treatment    Transfer Assessment/Treatment  sit-stand  transfer;stand-sit transfer;toilet transfer  -MP      Recorded by [MP] Ok Jara, OT 11/11/19 1631      Row Name 11/11/19 1600             Toilet Transfer    Type (Toilet Transfer)  sit-stand;stand-sit  -MP      [place] Level (Toilet Transfer)  contact guard;1 person assist  -MP      Recorded by [MP] Ok Jara OT 11/11/19 1631      Row Name 11/11/19 1600             ADL Assessment/Intervention    BADL Assessment/Intervention  toileting  -MP      Recorded by [MP] Ok Jara, OT 11/11/19 1631      Row Name 11/11/19 1600             Toileting Assessment/Training    [place] Level (Toileting)  toileting skills;perform perineal hygiene;set up;supervision  -MP      Toileting Position  supported sitting  -MP      Recorded by [MP] Ok Jara OT 11/11/19 1631      Row Name 11/11/19 1600             Positioning and Restraints    Pre-Treatment Position  in bed  -MP      Post Treatment Position  wheelchair  -MP      In Wheelchair  call light within reach;encouraged to call for assist;exit alarm on;with family/caregiver  -MP      Recorded by [MP] kO Jara, OT 11/11/19 1631      Row Name 11/11/19 1600             Pain Scale: Numbers Pre/Post-Treatment    Pain Scale: Numbers, Pretreatment  0/10 - no pain  -MP      Pain Scale: Numbers, Post-Treatment  0/10 - no pain  -MP      Recorded by [MP] Ok Jara OT 11/11/19 1631      Row Name 11/11/19 1600             Outcome Summary/Treatment Plan (OT)    Daily Summary of Progress (OT)  progress toward functional goals is good  -MP      Anticipated Discharge Disposition (OT)  inpatient rehabilitation facility  -MP      Recorded by [MP] Ok Jara, OT 11/11/19 1631        User Key  (r) = Recorded By, (t) = Taken By, (c) = Cosigned By    Initials Name Effective Dates Discipline    Ok Chopra OT 03/01/19 -  OT           Rehab Goal Summary     Row Name 11/11/19 1747             Gait Training Goal 1 (PT)     Activity/Assistive Device (Gait Training Goal 1, PT)  gait (walking locomotion)  -AO      [place] Level (Gait Training Goal 1, PT)  minimum assist (75% or more patient effort)  -AO      Distance (Gait Goal 1, PT)  100 ft  -AO      Time Frame (Gait Training Goal 1, PT)  2 weeks  -AO      Progress/Outcome (Gait Training Goal 1, PT)  goal met  -AO        User Key  (r) = Recorded By, (t) = Taken By, (c) = Cosigned By    Initials Name Provider Type Discipline    Alfreda Terrell, PT Physical Therapist PT        Occupational Therapy Education     Title: PT OT SLP Therapies (Done)     Topic: Occupational Therapy (Done)     Point: ADL training (Done)     Description: Instruct learner(s) on proper safety adaptation and remediation techniques during self care or transfers.   Instruct in proper use of assistive devices.    Learning Progress Summary           Patient Acceptance, E,TB, VU by KARI at 11/9/2019 10:49 AM   Family Acceptance, E,TB, VU by KARI at 11/9/2019 10:49 AM                   Point: Home exercise program (Done)     Description: Instruct learner(s) on appropriate technique for monitoring, assisting and/or progressing therapeutic exercises/activities.    Learning Progress Summary           Patient Acceptance, E,TB, VU by KARI at 11/9/2019 10:49 AM   Family Acceptance, E,TB, VU by KARI at 11/9/2019 10:49 AM                   Point: Precautions (Done)     Description: Instruct learner(s) on prescribed precautions during self-care and functional transfers.    Learning Progress Summary           Patient Acceptance, E,TB, VU by KARI at 11/9/2019 10:49 AM   Family Acceptance, E,TB, VU by KARI at 11/9/2019 10:49 AM                   Point: Body mechanics (Done)     Description: Instruct learner(s) on proper positioning and spine alignment during self-care, functional mobility activities and/or exercises.    Learning Progress Summary           Patient Acceptance, E,TB, VU by BUDDY at 11/11/2019  4:31 PM    Acceptance, E,TB, VU  by KARI at 11/9/2019 10:49 AM   Family Acceptance, E,TB, VU by  at 11/9/2019 10:49 AM                               User Key     Initials Effective Dates Name Provider Type Discipline    MP 03/01/19 -  Ok Jara OT Occupational Therapist OT    KARI 07/25/19 -  Savannah Vazquez OT Occupational Therapist OT                OT Recommendation and Plan  Outcome Summary/Treatment Plan (OT)  Daily Summary of Progress (OT): progress toward functional goals is good  Anticipated Discharge Disposition (OT): inpatient rehabilitation facility  Daily Summary of Progress (OT): progress toward functional goals is good  Plan of Care Review  Plan of Care Reviewed With: patient  Plan of Care Reviewed With: patient  Outcome Summary: Pt. demonstrates improved functional mobility this date and completes toileting activity w/ setup/supervision assist, albeit progress limited secondary to min VC's required for walker orientation and min A for balance during dynamic standing activity. Recommend IP rehab at d/c to address aforementioned deficits and mitigate falls risk. Will follow up w/ pt. 1-3x per week at [facility].   Outcome Measures     Row Name 11/09/19 1053 11/09/19 0900 11/09/19 0800       Modified [place] Scale    Pre-Stroke Modified [place] Scale  4 - Moderately severe disability.  Unable to walk without assistance, and unable to attend to own bodily needs without assistance.  -KARI  --  --    Modified [place] Scale  4 - Moderately severe disability.  Unable to walk without assistance, and unable to attend to own bodily needs without assistance.  -KARI  3 - Moderate disability.  Requiring some help, but able to walk without assistance.  -JR  --       Functional Assessment    Outcome Measure Options  Modified [place]  -KARI  --  Modified [place]  -KARI      User Key  (r) = Recorded By, (t) = Taken By, (c) = Cosigned By    Initials Name Provider Type    Viviana Glasgow, PT Physical Therapist    Savannah Christopher, SUBHASH Occupational  Therapist           Time Calculation:   Time Calculation- OT     Row Name 11/11/19 1633             Time Calculation- OT    OT Start Time  1435  -MP      OT Stop Time  1500  -MP      OT Time Calculation (min)  25 min  -      Total Timed Code Minutes- OT  10 minute(s)  -      OT Received On  11/11/19  -      OT - Next Appointment  11/13/19  -        User Key  (r) = Recorded By, (t) = Taken By, (c) = Cosigned By    Initials Name Provider Type    Ok Chopra OT Occupational Therapist        Therapy Charges for Today     Code Description Service Date Service Provider Modifiers Qty    [id number]  OT SELF CARE/MGMT/TRAIN EA 15 MIN 11/11/2019 Ok Jara OT GO 1               Ok Jara OT  11/11/2019

## 2019-11-11 NOTE — PROGRESS NOTES
Continued Stay Note  [facility]     Patient Name: Julianna Myles  MRN: [id number]  Today's Date: 11/11/2019    Admit Date: 11/8/2019    Discharge Plan     Row Name 11/11/19 1425       Plan    Plan  [facility] accepted subacute pending bed availability per Carina/liaison,no precert or PASRR needed    Patient/Family in Agreement with Plan  yes        Grazyna Cruz, [facility]sa[facility], LSW  [phone]

## 2019-11-11 NOTE — PROGRESS NOTES
LOS: 1 day   Patient Care Team:  Yessy Spears MD as PCP - General    Subjective:  Feels better    Objective:   Proximal muscle weakness      Review of Systems:   Review of Systems   Constitutional: Positive for activity change.   Respiratory: Negative.    Cardiovascular: Negative.    Gastrointestinal: Negative.    Musculoskeletal: Positive for arthralgias.   Neurological: Positive for weakness.           Vital Signs  Temp:  [97.3 °F (36.3 °C)-98.6 °F (37 °C)] 98.4 °F (36.9 °C)  Heart Rate:  [64-77] 65  Resp:  [11-22] 15  BP: (143-172)/(54-73) 158/69    Physical Exam:  Physical Exam   Constitutional: She appears well-developed and well-nourished.   HENT:   Head: Normocephalic and atraumatic.   Eyes: Pupils are equal, round, and reactive to light.   Neck: Normal range of motion.   Cardiovascular: Regular rhythm.   Pulmonary/Chest: Effort normal.   Abdominal: Soft.   Musculoskeletal: Normal range of motion. She exhibits edema. She exhibits no tenderness or deformity.   Neurological: She is alert.   Skin: Skin is warm.   Nursing note and vitals reviewed.       Radiology:  Ct Head Without Contrast    Result Date: 11/8/2019   1. No acute intracranial finding. 2. FINDINGS consistent with mild chronic microvascular disease and atrophy.  Electronically Signed By-Dr. Zayra Mena MD On:11/8/2019 4:40 PM This report was finalized on [id number] by Dr. Zayra Mena MD.    Mri Angiogram Head Without Contrast    Result Date: 11/9/2019  IMPRESSION :  1. No evidence of significant carotid stenosis. 2. Mild atherosclerotic disease within the intracranial internal carotid arteries without evidence of stenosis. 3. High-grade stenosis at the right P1/P2 junction with possible 2 mm aneurysm at the proximal right P2 segment. Nonemergent follow-up CTA would be required for more information. 4. No evidence of large vessel occlusion or additional high-grade stenosis.  Electronically Signed By-Daljit Marroquin On:11/9/2019 1:36 PM  This report was finalized on [id number] by  Daljit Marroquin, .    Mri Angiogram Neck With & Without Contrast    Result Date: 11/9/2019  IMPRESSION :  1. No evidence of significant carotid stenosis. 2. Mild atherosclerotic disease within the intracranial internal carotid arteries without evidence of stenosis. 3. High-grade stenosis at the right P1/P2 junction with possible 2 mm aneurysm at the proximal right P2 segment. Nonemergent follow-up CTA would be required for more information. 4. No evidence of large vessel occlusion or additional high-grade stenosis.  Electronically Signed By-Daljit Marroquin On:11/9/2019 1:36 PM This report was finalized on [id number] by  Daljit Marroquin, .    Mri Brain Without Contrast    Result Date: 11/9/2019  1.No restricted diffusion to suggest acute or subacute infarct. 2.Moderate amount of hyperintense FLAIR and T2 signal intensity periventricular and subcortical white matter. This is nonspecific, but most commonly seen with chronic small vessel ischemic changes. 3.Left mastoid air cell effusion. Correlate for mastoiditis.   Electronically Signed By-Bri Rivas MD On:11/9/2019 12:37 PM This report was finalized on [id number] by  Bri Rivas MD.    Xr Chest 1 View    Result Date: 11/8/2019  1. Cardiomegaly with mild bilateral perihilar airspace disease which may relate to pulmonary edema or pneumonia. 2. Small left pleural effusion.  Electronically Signed By-Rolly Lin On:11/8/2019 4:24 PM This report was finalized on [id number] by  Rolly Lin, .         Results Review:     I reviewed the patient's new clinical results.  I reviewed the patient's new imaging results and agree with the interpretation.    Medication Review:   Scheduled Meds:  atorvastatin 20 mg Oral Nightly   carBAMazepine 200 mg Oral Q6H   ceftriaxone 1 g Intravenous Q24H   hydrALAZINE 100 mg Oral Q12H   ibuprofen 800 mg Oral Q8H   pantoprazole 40 mg Oral QAM   potassium chloride 20 mEq Oral Daily   sodium  chloride 10 mL Intravenous Q12H   zonisamide 100 mg Oral Q6H     Continuous Infusions:   PRN Meds:.influenza vaccine  •  sodium chloride  •  sodium chloride    Labs:    CBC    Results from last 7 days   Lab Units 11/11/19  0416 11/09/19  0359 11/08/19  1628   WBC 10*3/mm3 4.20 6.20 8.30   HEMOGLOBIN g/dL 9.6* 9.7* 11.5*   PLATELETS 10*3/mm3 168 160 192     BMP Results from last 7 days   Lab Units 11/11/19  0416 11/09/19  0359 11/08/19  1628   SODIUM mmol/L 140 141 140   POTASSIUM mmol/L 4.0 3.5 3.3*   CHLORIDE mmol/L 107 105 102   CO2 mmol/L 22.0 24.0 26.0   BUN mg/dL 11 28* 33*   CREATININE mg/dL 0.55* 0.71 0.81   GLUCOSE mg/dL 103* 90 128*     Cr Clearance Estimated Creatinine Clearance: 38.5 mL/min (A) (by C-G formula based on SCr of 0.55 mg/dL (L)).  Coag     HbA1C   Lab Results   Component Value Date    HGBA1C 4.7 11/09/2019     Blood Glucose No results found for: POCGLU  Infection     CMP Results from last 7 days   Lab Units 11/11/19 0416 11/09/19  0359 11/08/19  1628   SODIUM mmol/L 140 141 140   POTASSIUM mmol/L 4.0 3.5 3.3*   CHLORIDE mmol/L 107 105 102   CO2 mmol/L 22.0 24.0 26.0   BUN mg/dL 11 28* 33*   CREATININE mg/dL 0.55* 0.71 0.81   GLUCOSE mg/dL 103* 90 128*   ALBUMIN g/dL 3.40* 3.40* 4.10   BILIRUBIN mg/dL 0.2 0.2 <0.2*   ALK PHOS U/L 45 43 47   AST (SGOT) U/L 22 29 37*   ALT (SGPT) U/L 14 15 20     UA  Results from last 7 days   Lab Units 11/08/19  1607   NITRITE UA  Negative   WBC UA /HPF None Seen   BACTERIA UA /HPF Trace*   SQUAM EPITHEL UA /HPF 0-2     Radiology(recent) Mri Angiogram Head Without Contrast    Result Date: 11/9/2019  IMPRESSION :  1. No evidence of significant carotid stenosis. 2. Mild atherosclerotic disease within the intracranial internal carotid arteries without evidence of stenosis. 3. High-grade stenosis at the right P1/P2 junction with possible 2 mm aneurysm at the proximal right P2 segment. Nonemergent follow-up CTA would be required for more information. 4. No evidence  of large vessel occlusion or additional high-grade stenosis.  Electronically Signed BySameer Marroquin On:11/9/2019 1:36 PM This report was finalized on [id number] by  Daljit Marroquin, .    Mri Angiogram Neck With & Without Contrast    Result Date: 11/9/2019  IMPRESSION :  1. No evidence of significant carotid stenosis. 2. Mild atherosclerotic disease within the intracranial internal carotid arteries without evidence of stenosis. 3. High-grade stenosis at the right P1/P2 junction with possible 2 mm aneurysm at the proximal right P2 segment. Nonemergent follow-up CTA would be required for more information. 4. No evidence of large vessel occlusion or additional high-grade stenosis.  Electronically Signed BySameer Marroquin On:11/9/2019 1:36 PM This report was finalized on [id number] by  Daljit Marroquin, .    Mri Brain Without Contrast    Result Date: 11/9/2019  1.No restricted diffusion to suggest acute or subacute infarct. 2.Moderate amount of hyperintense FLAIR and T2 signal intensity periventricular and subcortical white matter. This is nonspecific, but most commonly seen with chronic small vessel ischemic changes. 3.Left mastoid air cell effusion. Correlate for mastoiditis.   Electronically Signed By-Bri Rivas MD On:11/9/2019 12:37 PM This report was finalized on [id number] by  Bri Rivas MD.     Assessment:    1. Acute near syncope  2. Slurred speech/TIA?  3. Alzheimer's dementia without behavioral disturbance , late onset   4. Dyslipidemia  5.  Chronic kidney disease stage III  6. hypertension associated with chronic kidney disease stage III  7.  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  Community-acquired pneumonia present upon admission  9.  Physical deconditioning    Plan:   Work-up with cardiac and neurologic evaluations//will need rehabilitation to discharge home        Pavan Spears MD  11/11/19  8:13 AM

## 2019-11-11 NOTE — THERAPY TREATMENT NOTE
Patient Name: Julianna Myles  : 10/7/1932    MRN: [id number]                              Today's Date: 2019       Admit Date: 2019    Visit Dx:     ICD-10-CM ICD-9-CM   1. Weakness R53.1 780.79   2. Slurred speech R47.81 784.59   3. Fall, initial encounter W19.XXXA E888.9   4. Hypokalemia E87.6 276.8     Patient Active Problem List   Diagnosis   • Weakness   • Acute exacerbation of chronic obstructive pulmonary disease (COPD) (CMS/[facility])   • Common peroneal neuropathy   • Degeneration of lumbar or lumbosacral intervertebral disc   • Lumbar radiculopathy   • Spinal stenosis of lumbar region     Past Medical History:   Diagnosis Date   • Acute exacerbation of chronic obstructive pulmonary disease (COPD) (CMS/[facility]) 2013   • Arthritis    • Elevated cholesterol    • GERD (gastroesophageal reflux disease)      Past Surgical History:   Procedure Laterality Date   • BACK SURGERY       General Information    No documentation.       Mobility     Row Name 19 1434          Bed Mobility Assessment/Treatment    Bed Mobility Assessment/Treatment  supine-sit  -AO     Supine-Sit [place] (Bed Mobility)  supervision  -AO     Row Name 19 1434          Sit-Stand Transfer    Sit-Stand [place] (Transfers)  contact guard  -AO     Assistive Device (Sit-Stand Transfers)  walker, front-wheeled  -AO     Row Name 19 1434          Gait/Stairs Assessment/Training    Gait/Stairs Assessment/Training  distance ambulated  -AO     [place] Level (Gait)  contact guard  -AO     Assistive Device (Gait)  walker, front-wheeled  -AO     Distance in Feet (Gait)  100 ft  -AO     Pattern (Gait)  step-through  -AO     Deviations/Abnormal Patterns (Gait)  left sided deviations;margo decreased  -AO     Bilateral Gait Deviations  foot drop/toe drag Pt with L brace donned, however, inadequate at providing toe clearance and with excessive hip/knee flexinon during swing phase of gait to adequately clear floor  -AO      Comment (Gait/Stairs)  Pt requires VC'ing for safety and for staying within RW with tendency to step outside of RW and push RW too far anteriorly; increased difficulty navigating turns  -AO       User Key  (r) = Recorded By, (t) = Taken By, (c) = Cosigned By    Initials Name Provider Type    AO Alfreda Pickard, PT Physical Therapist        Obj/Interventions     Row Name 11/11/19 1550          Therapeutic Exercise    Comment (Therapeutic Exercise)  Pt completed 30 Second Sit to/from Stand test for 6 reps with use of BUEs  -AO     Row Name 11/11/19 1550          Static Sitting Balance    Level of [place] (Unsupported Sitting, Static Balance)  supervision  -AO     Sitting Position (Unsupported Sitting, Static Balance)  sitting on edge of bed  -AO     Time Able to Maintain Position (Unsupported Sitting, Static Balance)  1 to 2 minutes  -AO     Row Name 11/11/19 1550          Static Standing Balance    Level of [place] (Supported Standing, Static Balance)  contact guard assist  -AO     Time Able to Maintain Position (Supported Standing, Static Balance)  1 to 2 minutes  -AO     Comment (Supported Standing, Static Balance)  Pt able to maintain static standing balance ~ seconds while PT assisted with pericare needs s/p BM on BSC  -AO     Row Name 11/11/19 0318          Sensory Assessment/Intervention    Sensory General Assessment  no sensation deficits identified  -AO       User Key  (r) = Recorded By, (t) = Taken By, (c) = Cosigned By    Initials Name Provider Type    Alfreda Terrell, PT Physical Therapist        Goals/Plan     Row Name 11/11/19 0377          Gait Training Goal 1 (PT)    Activity/Assistive Device (Gait Training Goal 1, PT)  gait (walking locomotion)  -AO     [place] Level (Gait Training Goal 1, PT)  minimum assist (75% or more patient effort)  -AO     Distance (Gait Goal 1, PT)  100 ft  -AO     Time Frame (Gait Training Goal 1, PT)  2 weeks  -AO     Progress/Outcome (Gait Training  Goal 1, PT)  goal met  -AO       User Key  (r) = Recorded By, (t) = Taken By, (c) = Cosigned By    Initials Name Provider Type    Alfreda Terrell, PT Physical Therapist        Clinical Impression     Row Name 11/11/19 1551          Pain Assessment    Additional Documentation  Pain Scale: Numbers Pre/Post-Treatment (Group)  -AO     Row Name 11/11/19 1551          Pain Scale: Numbers Pre/Post-Treatment    Pain Scale: Numbers, Pretreatment  0/10 - no pain  -AO     Pain Scale: Numbers, Post-Treatment  0/10 - no pain  -AO     Row Name 11/11/19 1551          Plan of Care Review    Plan of Care Reviewed With  patient;daughter  -AO     Row Name 11/11/19 1551          Physical Therapy Clinical Impression    Criteria for Skilled Interventions Met (PT Clinical Impression)  yes;treatment indicated  -AO     Rehab Potential (PT Clinical Summary)  good, to achieve stated therapy goals  -AO     Row Name 11/11/19 1551          Vital Signs    Recovery Time  Vitals WNL throughout session on RA with no SOA noted  -AO     Row Name 11/11/19 1551          Positioning and Restraints    Pre-Treatment Position  in bed  -AO     Post Treatment Position  wheelchair  -AO     In Wheelchair  notified nsg;sitting;call light within reach;encouraged to call for assist;exit alarm on;with family/caregiver  -AO       User Key  (r) = Recorded By, (t) = Taken By, (c) = Cosigned By    Initials Name Provider Type    Alfreda Terrell, PT Physical Therapist        Outcome Measures    No documentation.       Physical Therapy Education     Title: PT OT SLP Therapies (Done)     Topic: Physical Therapy (Done)     Point: Mobility training (Done)     Learning Progress Summary           Patient Acceptance, E,SP, VU by JACOB at 11/11/2019  3:53 PM    Comment:  Educated pt on safety with RW during transfers/gait training and on POC with anticipated physical therapy needs at d/c.    Acceptance ESP, VU by  at 11/9/2019  9:48 AM                               User  Key     Initials Effective Dates Name Provider Type Discipline    AO 03/01/19 -  Alfreda Pickard, PT Physical Therapist PT    JR 06/10/19 -  Viviana Gooden PT Physical Therapist PT              PT Recommendation and Plan     Outcome Summary/Treatment Plan (PT)  Anticipated Discharge Disposition (PT): inpatient rehabilitation facility  Plan of Care Reviewed With: patient  Progress: improving  Outcome Summary: Pt demonstrated signifciant improvement in mobility/activity tolerance this session, progressing to gait training 100ft with RW requiring CGA and required CGA for transfers/static standing bout. Pt completed 30 Second Sit to/from Stand Test for 6 reps with vitals stable/WNL on RA throughout session. Pt continues to demonstrate impaired gait mechanics with L foot drop and inadequate bracing at this time (would benefit from custom AFO for improved toe clearance) and requires VC'ing for safety of navigation of RW. Recommending short IP rehab stay at d/c, as pt is not safe for home alone and remains increased risk for falls. Continue seeing 3x/week at [facility] for progression of mobility.     Time Calculation:   PT Charges     Row Name 11/11/19 1556             Time Calculation    Start Time  1434  -AO      Stop Time  1459  -AO      Time Calculation (min)  25 min  -AO      PT Received On  11/11/19  -AO      PT - Next Appointment  11/13/19  -AO         Time Calculation- PT    Total Timed Code Minutes- PT  25 minute(s)  -AO        User Key  (r) = Recorded By, (t) = Taken By, (c) = Cosigned By    Initials Name Provider Type    AO Alfreda Pickard, PT Physical Therapist        Therapy Charges for Today     Code Description Service Date Service Provider Modifiers Qty    09192521834 HC GAIT TRAINING EA 15 MIN 11/11/2019 Alfreda Pickard, PT GP 1    61278229025 HC PT THERAPEUTIC ACT EA 15 MIN 11/11/2019 Alfreda Pickard, PT GP 1          PT G-Codes  Outcome Measure Options: Modified Lily  Modified [place] Scale: 4 -  Moderately severe disability.  Unable to walk without assistance, and unable to attend to own bodily needs without assistance.    Alfreda Pickard, PT  11/11/2019

## 2019-11-11 NOTE — PROGRESS NOTES
Discharge Planning Assessment  [facility]     Patient Name: Julianna Myles  MRN: [id number]  Today's Date: 11/11/2019    Admit Date: 11/8/2019    Discharge Needs Assessment     Row Name 11/11/19 1456       Living Environment    Lives With  alone Patient states her family checks on her frequently    Current Living Arrangements  home/apartment/condo    Primary Care Provided by  self    Able to Return to Prior Arrangements  -- PT and OT recommends IP Rehab - HAs been accepted to [facility] subacute pending bed availlability        Transition Planning    Patient/Family Anticipates Transition to  inpatient rehabilitation facility    Patient/Family Anticipated Services at Transition  rehabilitation services    Transportation Anticipated  family or friend will provide       Discharge Needs Assessment    Readmission Within the Last 30 Days  no previous admission in last 30 days    Concerns to be Addressed  discharge planning    Equipment Currently Used at Home  walker, rolling;wheelchair;bipap/cpap        Discharge Plan     Row Name 11/11/19 1425       Plan    Plan  [facility] accepted subacute pending bed availability,no precert or PASRR needed    Patient/Family in Agreement with Plan  yes        Destination      Service Provider Request Status Selected Services Address Phone Number Fax Number    [facility] Accepted N/A [address] [facility] [place] [address] [phone]       Aleshia Farris RN 11/10/2019 0844    11/10/19: DC PLAN:  [facility] (level pending) P: [phone]- referral made 11/10/19 at 0840 and pending acceptance. No PASRR required for [facility]. No precert needed.   I met with pt and family at bedside to discuss discharge plans. Pt lives alone and has worsening weakness with falls prior to admission.  Pt and dgt are agreeable to inpatient rehab: services, coverage, and options discusses. Per pt and dgt their rehab choices: [facility]- referral made 11/10/19 at 0840, and [facility]- no referral  made as of yet.  Pt's status was converted to inpatient on 11/10/19 and she will be able to transfer to inpatient rehab on 11/13/19 if medically appropriate.   to follow up with [facility] rep on 11/11/19 to assess if they can accept.  A PASRR will be needed if pt does not go to [facility].  Will continue to follow and update as needed.                      Demographic Summary     Row Name 11/11/19 1457       General Information    Admission Type  inpatient    Arrived From  emergency department    Referral Source  admission list    Reason for Consult  discharge planning    Preferred Language  English        Functional Status     Row Name 11/11/19 1455       Functional Status, IADL    Medications  independent    Meal Preparation  independent    Housekeeping  independent    Laundry  independent    Shopping  completely dependent Patient does not drive           Brit Lynch RN, CM  Office Phone [phone]  Cell [phone]

## 2019-11-11 NOTE — CONSULTS
CARDIOLOGY CONSULT NOTE      Referring Provider: Regan    Reason for Consultation: Question of syncope    Attending: Pavan Spears MD    Chief complaint      Weakness, slurred speech, question of near syncope    Subjective .     History of present illness:  Julianna Myles is a 87 y.o. female who presents with complaints of intermittent slurred speech, weakness and presumed fall to the ground.  She was found on the floor by family members    The patient very clearly recounts the episodes that resulted in her being found on the floor.  She denies syncope or presyncope she reports feeling weak like she was not going to be able to continue walking and that her legs felt weak and she lowered herself to the ground and then crawled to her bed.  She denies loss of consciousness    She denies chest pain, dyspnea, PND, orthopnea, palpitations, lower extremity edema or feelings of her heart racing    Her past medical history is significant for hypertension, gastroesophageal reflux disease, immobility syndrome.     Patient is essentially wheelchair-bound.  However she can walk short distance within house with a walker.  She has chronic EVERETT.      Patient does not have previous history of CAD, MI or PCI.  Echocardiogram revealed normal LV function with no significant valvular flow abnormalities.     Review of Systems   Constitution: Positive for weakness. Negative for decreased appetite and diaphoresis.   HENT: Negative for congestion, hearing loss and nosebleeds.    Cardiovascular: Negative for chest pain, claudication, dyspnea on exertion, irregular heartbeat, leg swelling, near-syncope, orthopnea, palpitations, paroxysmal nocturnal dyspnea and syncope.   Respiratory: Negative for cough, shortness of breath and sleep disturbances due to breathing.    Endocrine: Negative for polyuria.   Hematologic/Lymphatic: Does not bruise/bleed easily.   Skin: Negative for itching and rash.   Musculoskeletal: Negative for back pain, muscle  weakness and myalgias.   Gastrointestinal: Negative for abdominal pain, change in bowel habit and nausea.   Genitourinary: Negative for dysuria, flank pain, frequency and hesitancy.   Neurological: Positive for loss of balance. Negative for dizziness and tremors.   Psychiatric/Behavioral: Negative for altered mental status. The patient does not have insomnia.        History  Past Medical History:   Diagnosis Date   • Acute exacerbation of chronic obstructive pulmonary disease (COPD) (CMS/[facility]) 2/1/2013   • Arthritis    • Elevated cholesterol    • GERD (gastroesophageal reflux disease)        Past Surgical History:   Procedure Laterality Date   • BACK SURGERY         Family History   Problem Relation Age of Onset   • Heart disease Father        Social History     Tobacco Use   • Smoking status: Never Smoker   • Smokeless tobacco: Never Used   Substance Use Topics   • Alcohol use: No     Frequency: Never   • Drug use: No        Medications Prior to Admission   Medication Sig Dispense Refill Last Dose   • carBAMazepine (TEGretol) 200 MG tablet Take 200 mg by mouth 4 (Four) Times a Day.   11/8/2019 at 12:00   • Flaxseed, Linseed, (FLAX SEED OIL) 1000 MG capsule Take 1 capsule by mouth Daily.   11/8/2019 at Unknown time   • gabapentin (NEURONTIN) 300 MG capsule Take 300 mg by mouth 3 (Three) Times a Day.   11/8/2019 at 12:00   • hydrALAZINE (APRESOLINE) 100 MG tablet Take 100 mg by mouth 2 (Two) Times a Day.   11/8/2019 at 12:00   • omeprazole (priLOSEC) 20 MG capsule Take 20 mg by mouth Daily.   11/8/2019 at Unknown time   • oxaprozin (DAYPRO) 600 MG tablet Take 600 mg by mouth 2 (Two) Times a Day.      • zonisamide (ZONEGRAN) 100 MG capsule Take 100 mg by mouth 4 (Four) Times a Day.   11/8/2019 at 12:00         Banana    Scheduled Meds:  aspirin 81 mg Oral Daily   atorvastatin 20 mg Oral Nightly   carBAMazepine 200 mg Oral Q6H   cefTRIAXone 1 g Intravenous Q24H   hydrALAZINE 100 mg Oral Q12H   ibuprofen 800 mg Oral Q8H  "  pantoprazole 40 mg Oral QAM   potassium chloride 20 mEq Oral Daily   sodium chloride 10 mL Intravenous Q12H   zonisamide 100 mg Oral Q6H     Continuous Infusions:   PRN Meds:.influenza vaccine  •  sodium chloride  •  sodium chloride    Objective     VITAL SIGNS  Vitals:    11/11/19 1032 11/11/19 1038 11/11/19 1130 11/11/19 1506   BP: (!) 185/76 (!) 185/80  147/74   BP Location: Left arm Left arm  Left arm   Patient Position: Sitting Standing  Sitting   Pulse: 69 68  68   Resp: 15 18  11   Temp:   97.9 °F (36.6 °C) 97.9 °F (36.6 °C)   TempSrc:   Axillary Axillary   SpO2: 96% 98%     Weight:       Height:           Flowsheet Rows      First Filed Value   Admission Height  152.4 cm (60\") Documented at 11/08/2019 1521   Admission Weight  55.1 kg (121 lb 7.6 oz) Documented at 11/08/2019 1521           TELEMETRY: SR    Physical Exam:  Physical Exam   Constitutional: She is oriented to person, place, and time. She appears well-developed and well-nourished. No distress.   HENT:   Head: Normocephalic and atraumatic.   Eyes: Pupils are equal, round, and reactive to light.   Neck: Normal range of motion. Neck supple. No JVD present.   Cardiovascular: Normal rate, regular rhythm, S1 normal, S2 normal, normal heart sounds and intact distal pulses.   No murmur heard.  Pulmonary/Chest: Effort normal and breath sounds normal.   Abdominal: Soft. Normal appearance. She exhibits no distension. There is no tenderness.   Musculoskeletal: Normal range of motion. She exhibits no edema.   Neurological: She is alert and oriented to person, place, and time. Gait normal.   Skin: Skin is warm and dry.   Psychiatric: She has a normal mood and affect. Her speech is normal and behavior is normal. Thought content normal.        Results Review:   I reviewed the patient's new clinical results.    CBC    Results from last 7 days   Lab Units 11/11/19  0416 11/09/19  0359 11/08/19  1628   WBC 10*3/mm3 4.20 6.20 8.30   HEMOGLOBIN g/dL 9.6* 9.7* 11.5* "   PLATELETS 10*3/mm3 168 160 192     BMP Results from last 7 days   Lab Units 11/11/19 0416 11/09/19  0359 11/08/19  1628   SODIUM mmol/L 140 141 140   POTASSIUM mmol/L 4.0 3.5 3.3*   CHLORIDE mmol/L 107 105 102   CO2 mmol/L 22.0 24.0 26.0   BUN mg/dL 11 28* 33*   CREATININE mg/dL 0.55* 0.71 0.81   GLUCOSE mg/dL 103* 90 128*     Cr Clearance Estimated Creatinine Clearance: 38.5 mL/min (A) (by C-G formula based on SCr of 0.55 mg/dL (L)).  Coag     HbA1C   Lab Results   Component Value Date    HGBA1C 4.7 11/09/2019     Blood Glucose No results found for: POCGLU  Infection     CMP Results from last 7 days   Lab Units 11/11/19 0416 11/09/19 0359 11/08/19  1628   SODIUM mmol/L 140 141 140   POTASSIUM mmol/L 4.0 3.5 3.3*   CHLORIDE mmol/L 107 105 102   CO2 mmol/L 22.0 24.0 26.0   BUN mg/dL 11 28* 33*   CREATININE mg/dL 0.55* 0.71 0.81   GLUCOSE mg/dL 103* 90 128*   ALBUMIN g/dL 3.40* 3.40* 4.10   BILIRUBIN mg/dL 0.2 0.2 <0.2*   ALK PHOS U/L 45 43 47   AST (SGOT) U/L 22 29 37*   ALT (SGPT) U/L 14 15 20     ABG      UA  Results from last 7 days   Lab Units 11/08/19  1607   NITRITE UA  Negative   WBC UA /HPF None Seen   BACTERIA UA /HPF Trace*   SQUAM EPITHEL UA /HPF 0-2     REBECCA  No results found for: POCMETH, POCAMPHET, POCBARBITUR, POCBENZO, POCCOCAINE, POCOPIATES, POCOXYCODO, POCPHENCYC, POCPROPOXY, POCTHC, POCTRICYC  Lysis Labs Results from last 7 days   Lab Units 11/11/19 0416 11/09/19 0359 11/08/19  1628   HEMOGLOBIN g/dL 9.6* 9.7* 11.5*   PLATELETS 10*3/mm3 168 160 192   CREATININE mg/dL 0.55* 0.71 0.81     Radiology(recent) Ct Angiogram Carotids    Result Date: 11/11/2019   1. 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 2. Right P2 segment returns to normal caliber without evidence of aneurysm. 3. Incompletely imaged ground glass irregular opacities within the lung apices.  Findings likely reflect infectious or inflammatory etiology.  Electronically Signed By-Job Zaldivar On:11/11/2019  12:10 PM This report was finalized on [id number] by  Job Zaldivar, .    Ct Angiogram Head    Result Date: 11/11/2019   1. 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 2. Right P2 segment returns to normal caliber without evidence of aneurysm. 3. Incompletely imaged ground glass irregular opacities within the lung apices.  Findings likely reflect infectious or inflammatory etiology.  Electronically Signed By-Job Zaldivar On:11/11/2019 12:10 PM This report was finalized on [id number] by  Job Zaldivar, .            Imaging Results (Last 24 Hours)     Procedure Component Value Units Date/Time    CT Angiogram Carotids [[id number]] Collected:  11/11/19 1202     Updated:  11/11/19 1409    Narrative:          DATE OF EXAM:  11/10/2019 3:51 PM     PROCEDURE:  CT ANGIOGRAM CAROTIDS-     INDICATIONS:   Aneurysm, neck vessel(s); R53.1-Weakness; R47.81-Slurred speech;  W19.XXXA-Unspecified fall, initial encounter; E87.6-Hypokalemia     COMPARISON:   No Comparisons Available     TECHNIQUE:  CTA of the head and CTA of the neck were performed after the intravenous  administration of 100 mL Isovue 370. Reconstructed coronal and sagittal  images were also obtained. In addition, a 3 D volume rendered image was  obtained after post processing. Automated exposure control and iterative  reconstruction methods were used.      FINDINGS:  VASCULAR FINDINGS: The aortic arch is normal in caliber. The right  brachiocephalic artery is widely patent. The right common carotid artery  is widely patent. There is minimal calcific plaquing within the right  proximal ICA. No measurable stenosis by criteria. The remainder of the  right ICA is widely patent. The right subclavian and right vertebral  artery are widely patent. There is mild calcific plaquing of the distal  vertebral arteries. Left common carotid artery arises from the left  brachiocephalic artery, a normal variant, bovine arch. There is calcific  plaquing in  the left proximal ICA, but again no measurable stenosis by  NASCET criteria. The remainder of the left ICA is widely patent. The  left subclavian artery and left vertebral artery are widely patent. The  distal ICAs demonstrate peripheral calcification, but no measurable  stenosis. The right A1 segment is very small, a normal variant. The  remainder of the bilateral anterior cerebral arteries are widely patent.  The bilateral MCAs are widely patent. The distal basilar artery is  widely patent. There is moderate stenosis of the right P1 segment. There  is no identifiable right posterior communicating artery. The right P2  segment is normal in caliber without evidence of aneurysm identified.  The bilateral PCAs appear widely patent.     NONVASCULAR FINDINGS: Limited evaluation of the lung apices demonstrates  irregular right upper lobe and left upper lobe groundglass opacity,  incompletely imaged. No adenopathy is identified. Thyroid is  unremarkable. Bilateral parotid glands are unremarkable. Brain  parenchyma is unremarkable. Orbits paranasal sinuses and mastoid air  cells are normal. No aggressive appearing lytic or sclerotic bone  lesions are identified.          Impression:          1. 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  2. Right P2 segment returns to normal caliber without evidence of  aneurysm.  3. Incompletely imaged ground glass irregular opacities within the lung  apices.  Findings likely reflect infectious or inflammatory etiology.     Electronically Signed By-Job Zaldivar On:11/11/2019 12:10 PM  This report was finalized on [id number] by  Job Zaldivar, .    CT Angiogram Head [[id number]] Collected:  11/11/19 1202     Updated:  11/11/19 1409    Narrative:          DATE OF EXAM:  11/10/2019 3:51 PM     PROCEDURE:  CT ANGIOGRAM CAROTIDS-     INDICATIONS:   Aneurysm, neck vessel(s); R53.1-Weakness; R47.81-Slurred speech;  W19.XXXA-Unspecified fall, initial encounter;  E87.6-Hypokalemia     COMPARISON:   No Comparisons Available     TECHNIQUE:  CTA of the head and CTA of the neck were performed after the intravenous  administration of 100 mL Isovue 370. Reconstructed coronal and sagittal  images were also obtained. In addition, a 3 D volume rendered image was  obtained after post processing. Automated exposure control and iterative  reconstruction methods were used.      FINDINGS:  VASCULAR FINDINGS: The aortic arch is normal in caliber. The right  brachiocephalic artery is widely patent. The right common carotid artery  is widely patent. There is minimal calcific plaquing within the right  proximal ICA. No measurable stenosis by criteria. The remainder of the  right ICA is widely patent. The right subclavian and right vertebral  artery are widely patent. There is mild calcific plaquing of the distal  vertebral arteries. Left common carotid artery arises from the left  brachiocephalic artery, a normal variant, bovine arch. There is calcific  plaquing in the left proximal ICA, but again no measurable stenosis by  NASCET criteria. The remainder of the left ICA is widely patent. The  left subclavian artery and left vertebral artery are widely patent. The  distal ICAs demonstrate peripheral calcification, but no measurable  stenosis. The right A1 segment is very small, a normal variant. The  remainder of the bilateral anterior cerebral arteries are widely patent.  The bilateral MCAs are widely patent. The distal basilar artery is  widely patent. There is moderate stenosis of the right P1 segment. There  is no identifiable right posterior communicating artery. The right P2  segment is normal in caliber without evidence of aneurysm identified.  The bilateral PCAs appear widely patent.     NONVASCULAR FINDINGS: Limited evaluation of the lung apices demonstrates  irregular right upper lobe and left upper lobe groundglass opacity,  incompletely imaged. No adenopathy is identified. Thyroid  is  unremarkable. Bilateral parotid glands are unremarkable. Brain  parenchyma is unremarkable. Orbits paranasal sinuses and mastoid air  cells are normal. No aggressive appearing lytic or sclerotic bone  lesions are identified.          Impression:          1. 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  2. Right P2 segment returns to normal caliber without evidence of  aneurysm.  3. Incompletely imaged ground glass irregular opacities within the lung  apices.  Findings likely reflect infectious or inflammatory etiology.     Electronically Signed By-Job Zaldivar On:11/11/2019 12:10 PM  This report was finalized on [id number] by  Job Zaldivar, .          EKG          I personally viewed and interpreted the patient's EKG/Telemetry data:    ECHOCARDIOGRAM:  Interpretation Summary     · Left ventricular systolic function is normal.  · There is calcification of the aortic valve.  · Mild mitral valve regurgitation is present  · Mild tricuspid valve regurgitation is present.  · LV ejection fraction is about 65 to 70%  · No pericardial effusion noted            STRESS MYOVIEW:    CARDIAC CATHETERIZATION:    OTHER:         Assessment/Plan     Question of near syncope        -Patient denies prodrome of lightheadedness or weakness       -Legs became weak and she lowered her self to ground to prevent fall   Preserved LV function  Transient slurred speech  HTN          No observed arrhythmias  Patient is not orthostatic  Echocardiogram showed preserved LV function with mild MR and TR  Denies chest pain, dyspnea has no overt signs and symptoms of heart failure  Neuro evaluation underway with EEG planned    Additional recommendations per Dr. Herzog    I discussed the patients findings and my recommendations with patient and family    RANDOLPH Loredo  11/11/19  3:57 PM

## 2019-11-11 NOTE — PLAN OF CARE
Problem: Patient Care Overview  Goal: Plan of Care Review   11/11/19 0611   OTHER   Outcome Summary pt is alert to time and self. Pt is not complaining of any pain, pt's daugher is at bedside. RN will continue to monitor.     Goal: Individualization and Mutuality  Outcome: Ongoing (interventions implemented as appropriate)

## 2019-11-12 ENCOUNTER — APPOINTMENT (OUTPATIENT)
Dept: NEUROLOGY | Facility: HOSPITAL | Age: 84
End: 2019-11-12

## 2019-11-12 PROBLEM — R56.9 FOCAL SEIZURE (HCC): Status: ACTIVE | Noted: 2019-11-12

## 2019-11-12 PROBLEM — I63.511 ACUTE ISCHEMIC RIGHT MCA STROKE: Status: ACTIVE | Noted: 2019-11-12

## 2019-11-12 LAB
ANION GAP SERPL CALCULATED.3IONS-SCNC: 10 MMOL/L (ref 5–15)
BUN BLD-MCNC: 10 MG/DL (ref 8–23)
BUN/CREAT SERPL: 22.2 (ref 7–25)
CALCIUM SPEC-SCNC: 8.5 MG/DL (ref 8.6–10.5)
CHLORIDE SERPL-SCNC: 105 MMOL/L (ref 98–107)
CO2 SERPL-SCNC: 26 MMOL/L (ref 22–29)
CREAT BLD-MCNC: 0.45 MG/DL (ref 0.57–1)
DEPRECATED RDW RBC AUTO: 48.6 FL (ref 37–54)
ERYTHROCYTE [DISTWIDTH] IN BLOOD BY AUTOMATED COUNT: 13.5 % (ref 12.3–15.4)
GFR SERPL CREATININE-BSD FRML MDRD: 132 ML/MIN/1.73
GLUCOSE BLD-MCNC: 94 MG/DL (ref 65–99)
GLUCOSE BLDC GLUCOMTR-MCNC: 116 MG/DL (ref 70–105)
HCT VFR BLD AUTO: 28 % (ref 34–46.6)
HGB BLD-MCNC: 9.4 G/DL (ref 12–15.9)
MCH RBC QN AUTO: 34.7 PG (ref 26.6–33)
MCHC RBC AUTO-ENTMCNC: 33.7 G/DL (ref 31.5–35.7)
MCV RBC AUTO: 103.2 FL (ref 79–97)
PLATELET # BLD AUTO: 191 10*3/MM3 (ref 140–450)
PMV BLD AUTO: 8.8 FL (ref 6–12)
POTASSIUM BLD-SCNC: 4.1 MMOL/L (ref 3.5–5.2)
RBC # BLD AUTO: 2.72 10*6/MM3 (ref 3.77–5.28)
SODIUM BLD-SCNC: 141 MMOL/L (ref 136–145)
WBC NRBC COR # BLD: 4.7 10*3/MM3 (ref 3.4–10.8)
ZONISAMIDE SERPL-MCNC: 20.6 UG/ML (ref 10–40)

## 2019-11-12 PROCEDURE — 99232 SBSQ HOSP IP/OBS MODERATE 35: CPT | Performed by: INTERNAL MEDICINE

## 2019-11-12 PROCEDURE — 95816 EEG AWAKE AND DROWSY: CPT | Performed by: PSYCHIATRY & NEUROLOGY

## 2019-11-12 PROCEDURE — 80048 BASIC METABOLIC PNL TOTAL CA: CPT | Performed by: FAMILY MEDICINE

## 2019-11-12 PROCEDURE — 99232 SBSQ HOSP IP/OBS MODERATE 35: CPT | Performed by: NURSE PRACTITIONER

## 2019-11-12 PROCEDURE — 4A10X4Z MONITORING OF CENTRAL NERVOUS ELECTRICAL ACTIVITY, EXTERNAL APPROACH: ICD-10-PCS | Performed by: PSYCHIATRY & NEUROLOGY

## 2019-11-12 PROCEDURE — 85027 COMPLETE CBC AUTOMATED: CPT | Performed by: FAMILY MEDICINE

## 2019-11-12 PROCEDURE — 25010000002 CEFTRIAXONE PER 250 MG: Performed by: NURSE PRACTITIONER

## 2019-11-12 PROCEDURE — 95816 EEG AWAKE AND DROWSY: CPT

## 2019-11-12 RX ORDER — ZONISAMIDE 100 MG/1
200 CAPSULE ORAL 2 TIMES DAILY
Status: DISCONTINUED | OUTPATIENT
Start: 2019-11-12 | End: 2019-11-13 | Stop reason: HOSPADM

## 2019-11-12 RX ORDER — CEPHALEXIN 500 MG/1
500 CAPSULE ORAL 3 TIMES DAILY
Qty: 21 CAPSULE | Refills: 0 | Status: SHIPPED | OUTPATIENT
Start: 2019-11-12

## 2019-11-12 RX ORDER — GABAPENTIN 300 MG/1
300 CAPSULE ORAL 3 TIMES DAILY
Status: DISCONTINUED | OUTPATIENT
Start: 2019-11-12 | End: 2019-11-13 | Stop reason: HOSPADM

## 2019-11-12 RX ORDER — ASPIRIN 81 MG/1
81 TABLET, CHEWABLE ORAL DAILY
Qty: 30 TABLET | Refills: 2 | Status: SHIPPED | OUTPATIENT
Start: 2019-11-12

## 2019-11-12 RX ORDER — CARBAMAZEPINE 200 MG/1
400 TABLET ORAL ONCE
Status: COMPLETED | OUTPATIENT
Start: 2019-11-12 | End: 2019-11-12

## 2019-11-12 RX ORDER — AMLODIPINE BESYLATE 5 MG/1
5 TABLET ORAL
Status: DISCONTINUED | OUTPATIENT
Start: 2019-11-12 | End: 2019-11-13 | Stop reason: HOSPADM

## 2019-11-12 RX ORDER — CARBAMAZEPINE 200 MG/1
600 TABLET ORAL 2 TIMES DAILY
Status: DISCONTINUED | OUTPATIENT
Start: 2019-11-12 | End: 2019-11-13 | Stop reason: HOSPADM

## 2019-11-12 RX ORDER — POTASSIUM CHLORIDE 20 MEQ/1
20 TABLET, EXTENDED RELEASE ORAL DAILY
Qty: 30 TABLET | Refills: 0 | Status: SHIPPED | OUTPATIENT
Start: 2019-11-12 | End: 2019-12-12

## 2019-11-12 RX ORDER — GABAPENTIN 100 MG/1
100 CAPSULE ORAL 3 TIMES DAILY
Qty: 90 CAPSULE | Refills: 3 | Status: SHIPPED | OUTPATIENT
Start: 2019-11-12

## 2019-11-12 RX ORDER — ATORVASTATIN CALCIUM 20 MG/1
20 TABLET, FILM COATED ORAL NIGHTLY
Qty: 30 TABLET | Refills: 3 | Status: SHIPPED | OUTPATIENT
Start: 2019-11-12 | End: 2019-12-12

## 2019-11-12 RX ADMIN — PANTOPRAZOLE SODIUM 40 MG: 40 TABLET, DELAYED RELEASE ORAL at 06:24

## 2019-11-12 RX ADMIN — IBUPROFEN 800 MG: 400 TABLET ORAL at 22:54

## 2019-11-12 RX ADMIN — CARBAMAZEPINE 600 MG: 200 TABLET ORAL at 20:24

## 2019-11-12 RX ADMIN — Medication 10 ML: at 20:25

## 2019-11-12 RX ADMIN — ZONISAMIDE 100 MG: 100 CAPSULE ORAL at 06:24

## 2019-11-12 RX ADMIN — POTASSIUM CHLORIDE 20 MEQ: 1500 TABLET, EXTENDED RELEASE ORAL at 11:34

## 2019-11-12 RX ADMIN — GABAPENTIN 300 MG: 300 CAPSULE ORAL at 16:47

## 2019-11-12 RX ADMIN — CEFTRIAXONE SODIUM 1 G: 1 INJECTION, POWDER, FOR SOLUTION INTRAMUSCULAR; INTRAVENOUS at 16:47

## 2019-11-12 RX ADMIN — Medication 10 ML: at 11:35

## 2019-11-12 RX ADMIN — GABAPENTIN 300 MG: 300 CAPSULE ORAL at 20:25

## 2019-11-12 RX ADMIN — AMLODIPINE BESYLATE 5 MG: 5 TABLET ORAL at 20:25

## 2019-11-12 RX ADMIN — IBUPROFEN 800 MG: 400 TABLET ORAL at 15:06

## 2019-11-12 RX ADMIN — CARBAMAZEPINE 400 MG: 200 TABLET ORAL at 11:34

## 2019-11-12 RX ADMIN — ASPIRIN 81 MG 81 MG: 81 TABLET ORAL at 11:34

## 2019-11-12 RX ADMIN — HYDRALAZINE HYDROCHLORIDE 100 MG: 25 TABLET, FILM COATED ORAL at 11:34

## 2019-11-12 RX ADMIN — CYANOCOBALAMIN TAB 1000 MCG 1000 MCG: 1000 TAB at 11:34

## 2019-11-12 RX ADMIN — ATORVASTATIN CALCIUM 20 MG: 20 TABLET, FILM COATED ORAL at 20:25

## 2019-11-12 RX ADMIN — CARBAMAZEPINE 200 MG: 200 TABLET ORAL at 06:24

## 2019-11-12 RX ADMIN — IBUPROFEN 800 MG: 400 TABLET ORAL at 06:24

## 2019-11-12 RX ADMIN — HYDRALAZINE HYDROCHLORIDE 100 MG: 25 TABLET, FILM COATED ORAL at 20:25

## 2019-11-12 NOTE — PLAN OF CARE
Problem: Patient Care Overview  Goal: Plan of Care Review  Outcome: Ongoing (interventions implemented as appropriate)      Problem: Fall Risk (Adult)  Goal: Absence of Fall  Outcome: Ongoing (interventions implemented as appropriate)      Problem: Skin Injury Risk (Adult)  Goal: Skin Health and Integrity  Outcome: Ongoing (interventions implemented as appropriate)      Problem: Activity Intolerance (Adult)  Goal: Activity Tolerance  Outcome: Ongoing (interventions implemented as appropriate)    Goal: Effective Energy Conservation Techniques  Outcome: Ongoing (interventions implemented as appropriate)

## 2019-11-12 NOTE — DISCHARGE SUMMARY
Date of Discharge:  11/12/2019    Discharge Diagnosis:   1. Intermittent dysarthria and bilateral leg wekness seondary to TME   2.  Acute TIA  3.  Community acquired pneumonia present upon admission  4.  Dyslipidemia  5.  Chronic kidney disease stage III  6.  Hypertension associated with chronic kidney disease stage III  7.  B12 deficiency  8.  Abnormal MRA of the neck with high-grade stenosis of the right P1 P2 junction/PCA stenosis  9.  Physical deconditioning       Presenting Problem/History of Present Illness  Active Hospital Problems    Diagnosis  POA   • Intracranial atherosclerosis [I67.2]  Yes   • Weakness [R53.1]  Yes      Resolved Hospital Problems   No resolved problems to display.          Hospital Course  Patient is a 87 y.o. female presented with intermittent dysarthria with bilateral leg weakness.  She was found to have acute community-acquired pneumonia and was evaluated by cardiology and neurology for possible acute near -syncopal event.  The patient did well overall and underwent an investigational battery.  He tolerated antimicrobial therapy and improved overall and was deemed stable for discharge to [facility] to complete acute rehabilitation prior to discharge home.  She will have close outpatient follow-up within 2 weeks from the time of discharge.    Procedures Performed  - CT head: No acute changes. Chronic small vessel ischemic disease and atrophy  - MRI brain: No acute changes. Chronic small vessel ischemic disease. Possible mastoiditis.   - MRA head & neck: No significant carotid stenosis. High-grade stenosis at the right P1/P2 junction with possible 2 mm aneurysm at the proximal right P2 segment     - EEG pending  - Echo: Left ventricular systolic function is normal. There is calcification of the aortic valve. Mild mitral valve regurgitation is present. Mild tricuspid valve regurgitation is present. LV ejection fraction is about 65 to 70% .No pericardial effusion  noted  - EKG: Sinus rhythm. Ventricular premature complex. Anterolateral infarct, old  - Labs: A1C: 4.7, B12: 286, LDL: 199, TSH: 1.09  Consults:   Consults     Date and Time Order Name Status Description    11/11/2019 0811 Inpatient Neurology Consult Stroke Completed     11/11/2019 0811 Inpatient Cardiology Consult Completed     11/8/2019 1735 Family Medicine Consult Completed           Pertinent Test Results:Ct Head Without Contrast    Result Date: 11/8/2019   1. No acute intracranial finding. 2. FINDINGS consistent with mild chronic microvascular disease and atrophy.  Electronically Signed By-Dr. Zayra Mena MD On:11/8/2019 4:40 PM This report was finalized on [id number] by Dr. Zayra Mena MD.    Mri Angiogram Head Without Contrast    Result Date: 11/9/2019  IMPRESSION :  1. No evidence of significant carotid stenosis. 2. Mild atherosclerotic disease within the intracranial internal carotid arteries without evidence of stenosis. 3. High-grade stenosis at the right P1/P2 junction with possible 2 mm aneurysm at the proximal right P2 segment. Nonemergent follow-up CTA would be required for more information. 4. No evidence of large vessel occlusion or additional high-grade stenosis.  Electronically Signed By-Daljit Marroquin On:11/9/2019 1:36 PM This report was finalized on [id number] by  Daljit Marroquin, .    Mri Angiogram Neck With & Without Contrast    Result Date: 11/9/2019  IMPRESSION :  1. No evidence of significant carotid stenosis. 2. Mild atherosclerotic disease within the intracranial internal carotid arteries without evidence of stenosis. 3. High-grade stenosis at the right P1/P2 junction with possible 2 mm aneurysm at the proximal right P2 segment. Nonemergent follow-up CTA would be required for more information. 4. No evidence of large vessel occlusion or additional high-grade stenosis.  Electronically Signed BySameer Marroquin On:11/9/2019 1:36 PM This report was finalized on [id number] by   Daljit Marroquin, .    Mri Brain Without Contrast    Result Date: 11/9/2019  1.No restricted diffusion to suggest acute or subacute infarct. 2.Moderate amount of hyperintense FLAIR and T2 signal intensity periventricular and subcortical white matter. This is nonspecific, but most commonly seen with chronic small vessel ischemic changes. 3.Left mastoid air cell effusion. Correlate for mastoiditis.   Electronically Signed By-Bri Rivas MD On:11/9/2019 12:37 PM This report was finalized on [id number] by  Bri Rivas MD.    Xr Chest 1 View    Result Date: 11/8/2019  1. Cardiomegaly with mild bilateral perihilar airspace disease which may relate to pulmonary edema or pneumonia. 2. Small left pleural effusion.  Electronically Signed By-Rolly Lin On:11/8/2019 4:24 PM This report was finalized on [id number] by  Rolly Lin, .    Ct Angiogram Carotids    Result Date: 11/11/2019   1. 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 2. Right P2 segment returns to normal caliber without evidence of aneurysm. 3. Incompletely imaged ground glass irregular opacities within the lung apices.  Findings likely reflect infectious or inflammatory etiology.  Electronically Signed By-Job Zaldivar On:11/11/2019 12:10 PM This report was finalized on [id number] by  Job Zaldivar .    Ct Angiogram Head    Result Date: 11/11/2019   1. 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 2. Right P2 segment returns to normal caliber without evidence of aneurysm. 3. Incompletely imaged ground glass irregular opacities within the lung apices.  Findings likely reflect infectious or inflammatory etiology.  Electronically Signed By-Job Zaldivar On:11/11/2019 12:10 PM This report was finalized on [id number] by  Job Zaldivar, .      Imaging Results (Last 7 Days)     Procedure Component Value Units Date/Time    CT Angiogram Carotids [[id number]] Collected:  11/11/19 1202     Updated:  11/11/19 0331     Narrative:          DATE OF EXAM:  11/10/2019 3:51 PM     PROCEDURE:  CT ANGIOGRAM CAROTIDS-     INDICATIONS:   Aneurysm, neck vessel(s); R53.1-Weakness; R47.81-Slurred speech;  W19.XXXA-Unspecified fall, initial encounter; E87.6-Hypokalemia     COMPARISON:   No Comparisons Available     TECHNIQUE:  CTA of the head and CTA of the neck were performed after the intravenous  administration of 100 mL Isovue 370. Reconstructed coronal and sagittal  images were also obtained. In addition, a 3 D volume rendered image was  obtained after post processing. Automated exposure control and iterative  reconstruction methods were used.      FINDINGS:  VASCULAR FINDINGS: The aortic arch is normal in caliber. The right  brachiocephalic artery is widely patent. The right common carotid artery  is widely patent. There is minimal calcific plaquing within the right  proximal ICA. No measurable stenosis by criteria. The remainder of the  right ICA is widely patent. The right subclavian and right vertebral  artery are widely patent. There is mild calcific plaquing of the distal  vertebral arteries. Left common carotid artery arises from the left  brachiocephalic artery, a normal variant, bovine arch. There is calcific  plaquing in the left proximal ICA, but again no measurable stenosis by  NASCET criteria. The remainder of the left ICA is widely patent. The  left subclavian artery and left vertebral artery are widely patent. The  distal ICAs demonstrate peripheral calcification, but no measurable  stenosis. The right A1 segment is very small, a normal variant. The  remainder of the bilateral anterior cerebral arteries are widely patent.  The bilateral MCAs are widely patent. The distal basilar artery is  widely patent. There is moderate stenosis of the right P1 segment. There  is no identifiable right posterior communicating artery. The right P2  segment is normal in caliber without evidence of aneurysm identified.  The bilateral PCAs  appear widely patent.     NONVASCULAR FINDINGS: Limited evaluation of the lung apices demonstrates  irregular right upper lobe and left upper lobe groundglass opacity,  incompletely imaged. No adenopathy is identified. Thyroid is  unremarkable. Bilateral parotid glands are unremarkable. Brain  parenchyma is unremarkable. Orbits paranasal sinuses and mastoid air  cells are normal. No aggressive appearing lytic or sclerotic bone  lesions are identified.          Impression:          1. 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  2. Right P2 segment returns to normal caliber without evidence of  aneurysm.  3. Incompletely imaged ground glass irregular opacities within the lung  apices.  Findings likely reflect infectious or inflammatory etiology.     Electronically Signed By-Job Zaldivar On:11/11/2019 12:10 PM  This report was finalized on [id number] by  Job Zaldivar, .    CT Angiogram Head [[id number]] Collected:  11/11/19 1202     Updated:  11/11/19 1409    Narrative:          DATE OF EXAM:  11/10/2019 3:51 PM     PROCEDURE:  CT ANGIOGRAM CAROTIDS-     INDICATIONS:   Aneurysm, neck vessel(s); R53.1-Weakness; R47.81-Slurred speech;  W19.XXXA-Unspecified fall, initial encounter; E87.6-Hypokalemia     COMPARISON:   No Comparisons Available     TECHNIQUE:  CTA of the head and CTA of the neck were performed after the intravenous  administration of 100 mL Isovue 370. Reconstructed coronal and sagittal  images were also obtained. In addition, a 3 D volume rendered image was  obtained after post processing. Automated exposure control and iterative  reconstruction methods were used.      FINDINGS:  VASCULAR FINDINGS: The aortic arch is normal in caliber. The right  brachiocephalic artery is widely patent. The right common carotid artery  is widely patent. There is minimal calcific plaquing within the right  proximal ICA. No measurable stenosis by criteria. The remainder of the  right ICA is widely  patent. The right subclavian and right vertebral  artery are widely patent. There is mild calcific plaquing of the distal  vertebral arteries. Left common carotid artery arises from the left  brachiocephalic artery, a normal variant, bovine arch. There is calcific  plaquing in the left proximal ICA, but again no measurable stenosis by  NASCET criteria. The remainder of the left ICA is widely patent. The  left subclavian artery and left vertebral artery are widely patent. The  distal ICAs demonstrate peripheral calcification, but no measurable  stenosis. The right A1 segment is very small, a normal variant. The  remainder of the bilateral anterior cerebral arteries are widely patent.  The bilateral MCAs are widely patent. The distal basilar artery is  widely patent. There is moderate stenosis of the right P1 segment. There  is no identifiable right posterior communicating artery. The right P2  segment is normal in caliber without evidence of aneurysm identified.  The bilateral PCAs appear widely patent.     NONVASCULAR FINDINGS: Limited evaluation of the lung apices demonstrates  irregular right upper lobe and left upper lobe groundglass opacity,  incompletely imaged. No adenopathy is identified. Thyroid is  unremarkable. Bilateral parotid glands are unremarkable. Brain  parenchyma is unremarkable. Orbits paranasal sinuses and mastoid air  cells are normal. No aggressive appearing lytic or sclerotic bone  lesions are identified.          Impression:          1. 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  2. Right P2 segment returns to normal caliber without evidence of  aneurysm.  3. Incompletely imaged ground glass irregular opacities within the lung  apices.  Findings likely reflect infectious or inflammatory etiology.     Electronically Signed By-Job Zaldivar On:11/11/2019 12:10 PM  This report was finalized on [id number] by  Job Zaldivar, .    MRI Angiogram Neck With & Without Contrast  [[id number]] Collected:  11/09/19 1328     Updated:  11/09/19 1338    Narrative:          DATE OF EXAM:   11/9/2019 11:42 AM     PROCEDURE:   MRI ANGIOGRAM NECK W WO CONTRAST  MRI ANGIOGRAM HEAD WO CONTRAST-     INDICATIONS:   slurred speech, confusion, dizziness; R53.1-Weakness; R47.81-Slurred  speech; W19.XXXA-Unspecified fall, initial encounter; E87.6-Hypokalemia     COMPARISON:  No Comparisons Available     TECHNIQUE:   Noncontrast high-resolution MR angiographic imaging of the brain was  performed. 3-D MIP reconstructions were created for interpretation.  Nonenhanced and enhanced MRA was performed from the aortic arch through  the skull base, with field of view to include the subclavian, axillary  and proximal brachial artery segments. A total of 20  ml ProHance was  administered intravenously. 3-D maximum intensity projection images were  created and viewed along with source images.     FINDINGS:   MRA neck: There is mild atherosclerotic irregularity in the aortic arch.  There is some signal dropout in the great vessels although there is some  motion artifact at this level, which limits evaluation. The  brachiocephalic artery and bilateral subclavian arteries appear patent  proximally. The distal right subclavian artery is not assessed.     The common carotid arteries appear patent. Carotid bifurcations appear  unremarkable. The external carotid arteries and distal branches appear  grossly patent. The right cervical internal carotid artery is  significantly tortuous distally. The left internal carotid artery is  markedly tortuous at the midsegment. There is no evidence of a  high-grade carotid stenosis.     The vertebral arteries are codominant. Vertebral arteries arise as  expected from ipsilateral subclavian arteries. The right vertebral  artery origin is not assessed adequately. There is a normal course of  the vertebral arteries, which appear patent throughout the neck. V4  segments are unremarkable.      MRA BRAIN: Distal cervical internal carotid arteries appear within  normal limits. There is atherosclerotic irregularity noted throughout  the intracranial ICA segments, which both appear widely patent. The  ophthalmic artery origins are not assessed due to motion. The right A1  segment is hypoplastic. The left A1 segment is robust and there is a  large anterior communicating artery with the left JULIETTE being the primary  supply of both anterior cerebral arteries, which appear patent. The M1  segments appear normal bilaterally. Distal MCA branches appear within  normal limits. There is a large left-sided posterior communicating  artery. There is no definite right posterior communicating artery.     Bilateral V4 segments appear within normal limits. The basilar artery is  widely patent. Bilateral anterior inferior cerebellar arteries and  superior cerebellar arteries appear widely patent. Bilateral P1 segments  appear within normal limits. There is a high-grade stenosis at the  junction of the right P1 and P2 segments. There is a 2 mm outpouching at  the superior aspect of the proximal right P2 segment, which could  reflect a small aneurysm. The remainder of the right PCA appears within  normal limits. The left P1 segment and distal PCA branches appear within  normal limits.       Impression:       IMPRESSION :      1. No evidence of significant carotid stenosis.  2. Mild atherosclerotic disease within the intracranial internal carotid  arteries without evidence of stenosis.  3. High-grade stenosis at the right P1/P2 junction with possible 2 mm  aneurysm at the proximal right P2 segment. Nonemergent follow-up CTA  would be required for more information.  4. No evidence of large vessel occlusion or additional high-grade  stenosis.     Electronically Signed By-Daljit Marroquin On:11/9/2019 1:36 PM  This report was finalized on [id number] by  Daljit Marroquin, .    MRI Angiogram Head Without Contrast [[id number]] Collected:   11/09/19 1328     Updated:  11/09/19 1338    Narrative:          DATE OF EXAM:   11/9/2019 11:42 AM     PROCEDURE:   MRI ANGIOGRAM NECK W WO CONTRAST  MRI ANGIOGRAM HEAD WO CONTRAST-     INDICATIONS:   slurred speech, confusion, dizziness; R53.1-Weakness; R47.81-Slurred  speech; W19.XXXA-Unspecified fall, initial encounter; E87.6-Hypokalemia     COMPARISON:  No Comparisons Available     TECHNIQUE:   Noncontrast high-resolution MR angiographic imaging of the brain was  performed. 3-D MIP reconstructions were created for interpretation.  Nonenhanced and enhanced MRA was performed from the aortic arch through  the skull base, with field of view to include the subclavian, axillary  and proximal brachial artery segments. A total of 20  ml ProHance was  administered intravenously. 3-D maximum intensity projection images were  created and viewed along with source images.     FINDINGS:   MRA neck: There is mild atherosclerotic irregularity in the aortic arch.  There is some signal dropout in the great vessels although there is some  motion artifact at this level, which limits evaluation. The  brachiocephalic artery and bilateral subclavian arteries appear patent  proximally. The distal right subclavian artery is not assessed.     The common carotid arteries appear patent. Carotid bifurcations appear  unremarkable. The external carotid arteries and distal branches appear  grossly patent. The right cervical internal carotid artery is  significantly tortuous distally. The left internal carotid artery is  markedly tortuous at the midsegment. There is no evidence of a  high-grade carotid stenosis.     The vertebral arteries are codominant. Vertebral arteries arise as  expected from ipsilateral subclavian arteries. The right vertebral  artery origin is not assessed adequately. There is a normal course of  the vertebral arteries, which appear patent throughout the neck. V4  segments are unremarkable.     MRA BRAIN: Distal  cervical internal carotid arteries appear within  normal limits. There is atherosclerotic irregularity noted throughout  the intracranial ICA segments, which both appear widely patent. The  ophthalmic artery origins are not assessed due to motion. The right A1  segment is hypoplastic. The left A1 segment is robust and there is a  large anterior communicating artery with the left JULIETTE being the primary  supply of both anterior cerebral arteries, which appear patent. The M1  segments appear normal bilaterally. Distal MCA branches appear within  normal limits. There is a large left-sided posterior communicating  artery. There is no definite right posterior communicating artery.     Bilateral V4 segments appear within normal limits. The basilar artery is  widely patent. Bilateral anterior inferior cerebellar arteries and  superior cerebellar arteries appear widely patent. Bilateral P1 segments  appear within normal limits. There is a high-grade stenosis at the  junction of the right P1 and P2 segments. There is a 2 mm outpouching at  the superior aspect of the proximal right P2 segment, which could  reflect a small aneurysm. The remainder of the right PCA appears within  normal limits. The left P1 segment and distal PCA branches appear within  normal limits.       Impression:       IMPRESSION :      1. No evidence of significant carotid stenosis.  2. Mild atherosclerotic disease within the intracranial internal carotid  arteries without evidence of stenosis.  3. High-grade stenosis at the right P1/P2 junction with possible 2 mm  aneurysm at the proximal right P2 segment. Nonemergent follow-up CTA  would be required for more information.  4. No evidence of large vessel occlusion or additional high-grade  stenosis.     Electronically Signed By-Daljit Marroquin On:11/9/2019 1:36 PM  This report was finalized on [id number] by  Daljit Marroquin, .    MRI Brain Without Contrast [[id number]] Collected:  11/09/19 1232     Updated:   11/09/19 1239    Narrative:       MRI BRAIN WO CONTRAST-     Date of Exam: 11/9/2019 11:42 AM     Indication: Confusion/delirium, altered LOC, unexplained;  R53.1-Weakness; R47.81-Slurred speech; W19.XXXA-Unspecified fall,  initial encounter; E87.6-Hypokalemia     Comparison: CT head without contrast 11/08/2019     Technique: Multiplanar multisequence images of the brain were performed  without contrast according to routine brain MRI protocol.     FINDINGS:  No restricted diffusion to suggest acute or subacute infarct.  No  intracranial mass, midline shift, intracranial hemorrhage, or pathologic  extraaxial fluid collection.  The ventricular system is nondilated.   Brain volume is normal for patient's age.  The gray and white matter  signal characteristics are normal. Small calcifications causing mild  blooming within the parenchyma of the cerebellum and less sparse in the  frontal and parietal lobes. Likely to remote insult/infection. These  were seen on CT. There is a moderate amount of hyperintense FLAIR and T2  signal intensity in the periventricular and subcortical white matter.  This is nonspecific, but most commonly seen with chronic small vessel  ischemic changes.  The vertebral arteries, basilar artery and internal carotid arteries  appear large. Normal flow-voids are seen in these vessels on T2-weighted  sequences. There is some fluid within the inferior left mastoid air  cells. The globes and extraocular muscles are normal.  No cerebellar  pontine angle masses.  Craniocervical junction is normal.  Pituitary  gland has normal size and signal intensity for patient's age and gender.          Impression:       1.No restricted diffusion to suggest acute or subacute infarct.  2.Moderate amount of hyperintense FLAIR and T2 signal intensity  periventricular and subcortical white matter. This is nonspecific, but  most commonly seen with chronic small vessel ischemic changes.  3.Left mastoid air cell effusion.  Correlate for mastoiditis.        Electronically Signed By-Bri Rivas MD On:11/9/2019 12:37 PM  This report was finalized on [id number] by  Bri Rivas MD.    CT Head Without Contrast [[id number]] Collected:  11/08/19 1639     Updated:  11/08/19 1648    Narrative:       CT HEAD WO CONTRAST-     Date of Exam: 11/8/2019 4:34 PM     Indication: fall 1 week prior, increased confusion since.     Comparison: None available.     Technique:  Without contrast, contiguous axial CT images of the head  were obtained from skull base to vertex.  Coronal and sagittal  reconstructions were performed.  Automated exposure control and  iterative reconstruction methods were used.     FINDINGS  Mild hypodensities in the deep white matter the brain are nonspecific  but favored to reflect changes of chronic microvascular disease. The  gray matter-white matter junction distinction appears preserved without  CT evidence of acute or evolving infarct. No mass lesion, mass effect,  midline shift or acute intracranial hemorrhage is identified. Mild  age-appropriate parenchymal atrophy with compensatory prominence of  ventricles and extra-axial spaces. Moderate intracranial carotid artery  and vertebral artery calcifications. Mastoid air cells and paranasal  sinuses are clear.       Impression:          1. No acute intracranial finding.  2. FINDINGS consistent with mild chronic microvascular disease and  atrophy.     Electronically Signed By-Dr. Zayra Mena MD On:11/8/2019 4:40 PM  This report was finalized on [id number] by Dr. Zayra Mena MD.    XR Chest 1 View [[id number]] Collected:  11/08/19 1623     Updated:  11/08/19 1628    Narrative:          DATE OF EXAM:   11/8/2019 4:19 PM     PROCEDURE:   XR CHEST 1 VW-     INDICATIONS:   AMS protocol     COMPARISON:  No Comparisons Available     TECHNIQUE:   [Portable chest radiograph]     FINDINGS:   Patient slightly rotated to left. The heart is mildly enlarged,  exaggerated by  portable technique. Dense mitral annular calcifications  noted. There are mild bilateral perihilar opacities which could relate  to pulmonary edema or pneumonia. No pneumothorax. Small left pleural  effusion.       Impression:       1. Cardiomegaly with mild bilateral perihilar airspace disease which may  relate to pulmonary edema or pneumonia.  2. Small left pleural effusion.     Electronically Signed By-Rolly Lin On:11/8/2019 4:24 PM  This report was finalized on [id number] by  Rolly Lin, .              Condition on Discharge:  Fair    Vital Signs  Temp:  [97.4 °F (36.3 °C)-99.2 °F (37.3 °C)] 98.7 °F (37.1 °C)  Heart Rate:  [61-74] 67  Resp:  [11-21] 21  BP: (141-185)/(48-80) 164/66    Physical Exam:     General Appearance:    Alert, cooperative, in no acute distress   Head:    Normocephalic, without obvious abnormality, atraumatic   Eyes:           Conjunctivae and sclerae normal, no   icterus, no pallor, corneas clear, PERRLA   Throat:   No oral lesions, no thrush, oral mucosa moist   Neck:   No adenopathy, supple, trachea midline, no thyromegaly, no   carotid bruit, no JVD   Lungs:     Clear to auscultation,respirations regular, even and                  unlabored    Heart:    Regular rhythm and normal rate, normal S1 and S2, no            murmur, no gallop, no rub, no click   Chest Wall:    No abnormalities observed   Abdomen:     Normal bowel sounds, no masses, no organomegaly, soft        non-tender, non-distended, no guarding, no rebound                tenderness   Rectal:     Deferred   Extremities:   Moves all extremities well, no edema, no cyanosis, no             redness   Pulses:   Pulses palpable and equal bilaterally   Skin:   No bleeding, bruising or rash   Lymph nodes:   No palpable adenopathy   Neurologic:   Cranial nerves 2 - 12 grossly intact, sensation intact, DTR       present and equal bilaterally         Discharge Disposition  Rehab Facility or Unit (DC - External)    Discharge  Medications     Discharge Medications      New Medications      Instructions Start Date   aspirin 81 MG chewable tablet   81 mg, Oral, Daily      atorvastatin 20 MG tablet  Commonly known as:  LIPITOR   20 mg, Oral, Nightly      cephalexin 500 MG capsule  Commonly known as:  KEFLEX   500 mg, Oral, 3 Times Daily      cyanocobalamin 1000 MCG tablet  Commonly known as:  VITAMIN B-12   1,000 mcg, Oral, Daily      potassium chloride 20 MEQ CR tablet  Commonly known as:  K-DUR,KLOR-CON   20 mEq, Oral, Daily         Changes to Medications      Instructions Start Date   gabapentin 100 MG capsule  Commonly known as:  NEURONTIN  What changed:    · medication strength  · how much to take   100 mg, Oral, 3 Times Daily         Continue These Medications      Instructions Start Date   carBAMazepine 200 MG tablet  Commonly known as:  TEGretol   200 mg, Oral, 4 Times Daily      hydrALAZINE 100 MG tablet  Commonly known as:  APRESOLINE   100 mg, Oral, 2 Times Daily      omeprazole 20 MG capsule  Commonly known as:  priLOSEC   20 mg, Oral, Daily      zonisamide 100 MG capsule  Commonly known as:  ZONEGRAN   100 mg, Oral, 4 Times Daily         Stop These Medications    DAYPRO 600 MG tablet  Generic drug:  oxaprozin     Flax Seed Oil 1000 MG capsule            Discharge Diet:   Cardiac  Activity at Discharge:   As tolerated  Follow-up Appointments  No future appointments.   Follow-up with us within 1 to 2 weeks from the time of discharge      Test Results Pending at Discharge   Order Current Status    Carbamazepine Level, Free & Total In process    Zonisamide Level In process        EEG  Pavan Spears MD  11/12/19  9:03 AM

## 2019-11-12 NOTE — PROCEDURES
This is an inpatient,   Digitally recorded multi-montage EEG with leads placed according to the international 10/20 system  Photic stimulation was done  Hyperventilation was  done    With the patient awake and drowsy the posterior background was 5 to 7.5 Hz with some beta artifact    Towards the end some brief stage II sleep was seen    Gross the recording there was phase reversing the most clearly between C3 and P3, some between F3 and C3 and some between F7 and T3.  Rarely C4 and P4 phase reversing was seen.  No clinical events    Ventilation was attempted but I do not see any significant changes next foot examination was attempted in intermittent stepwise pattern up to the flash frequency of 30 Hz but I do not see any driving, asymmetry of paroxysmal activity  Impression:    This is an abnormal adult, awake/drowsy/asleep EEG which showed likely irritable foci left greater than right, central parietal greater than other areas    This clinically correlate with the patient history and imaging studies

## 2019-11-12 NOTE — PLAN OF CARE
Problem: Patient Care Overview  Goal: Individualization and Mutuality  Outcome: Ongoing (interventions implemented as appropriate)   11/11/19 2032   Mutuality/Individual Preferences   How Would You and/or Your Support Person Like to Participate in Your Care? .     Goal: Discharge Needs Assessment  Outcome: Ongoing (interventions implemented as appropriate)   11/11/19 2032   Discharge Needs Assessment   Concerns to be Addressed adjustment to diagnosis/illness;cognitive/perceptual;decision making   Patient/Family Anticipates Transition to long term care facility   Transportation Concerns car, none   Anticipated Changes Related to Illness inability to care for self   Current Discharge Risk cognitively impaired;dependent with mobility/activities of daily living

## 2019-11-12 NOTE — PROGRESS NOTES
LOS: 2 days     Chief Complaint:   intermittent dysarthria with bilateral leg weakness       SUBJECTIVE:  History taken from: patient chart family RN    Interval History: Pt reports that she had bilateral leg weakness and felt like she was going to fall so she lowered herself to the ground. She denies falling or losing consciousness. She had difficulty getting up so she crawled over to her bed and pulled herself up. She has reportedly had 3 episodes of dysarthria and confusion over the past week. She may have had a left facial droop.    Pt is noted to be on 3 antiepileptics, but denies ever having a seizure. She reports these meds were started by an outpt neurologist in the past for what she thinks was trigeminal neuralgia.     Patient Complaints: Pt denies any complaints at this time. Feels well      Review of Systems   Constitutional: Negative.    Neurological: Negative.         ________________________________________________     OBJECTIVE:  Pt is awake and alert. Very pleasant.       Neurologic Exam    PHYSICAL EXAM:    Constitutional: The patient is in no apparent distress, bright awake and alert. There is no shortness of breath.     HEENT: There is no tenderness over the temporal arteries bilaterally. Normocephalic, atraumatic. TMJ open symmetrically without tenderness.    Chest: Breathing unlabored    Cardiac: Regular rate and rhythm.     Extremities:  No clubbing, cyanosis or edema.    NEUROLOGICAL:    Cognition:   Fully oriented.  Fund of knowledge excellent.  Concentration and attention normal.   Language normal with normal comprehension, fluent speech, intact repetition and naming.   Short and long term memory appears intact    Cranial nerves;    II - pupils bilaterally equal reacting to light,  No new Visual field deficits;  Fundoscopic exam- Not able to be done, non-dilated exam  III,IV,VI: EOMI with no diplopia  V: Normal facial sensations  VII: Minimal left facial asymmetry  VIII: No New hearing  abnormality  IX, X, XI: normal gag and shoulder shrug;  XII: tongue is in the midline.    Sensory:  Intact to light touch in all extremities.     Motor: Strength 5-/5 bilaterally upper and lower extremities except left foot drop. No involuntary movements present. Normal tone and bulk.  Deep tendon reflexes: 1/4    Cerebellar: Finger to nose and mirror movements normal bilaterally.    Gait and balance:deferred      ________________________________________________   RESULTS REVIEW    VITAL SIGNS:  Temp:  [97.4 °F (36.3 °C)-99.2 °F (37.3 °C)] 98.1 °F (36.7 °C)  Heart Rate:  [59-74] 59  Resp:  [11-21] 21  BP: (144-184)/(48-74) 171/64    LABS:   Lab Results   Component Value Date    WBC 4.70 11/12/2019    HGB 9.4 (L) 11/12/2019    HCT 28.0 (L) 11/12/2019    .2 (H) 11/12/2019     11/12/2019     Lab Results   Component Value Date    GLUCOSE 94 11/12/2019    BUN 10 11/12/2019    CREATININE 0.45 (L) 11/12/2019    EGFRIFNONA 132 11/12/2019    BCR 22.2 11/12/2019    K 4.1 11/12/2019    CO2 26.0 11/12/2019    CALCIUM 8.5 (L) 11/12/2019    ALBUMIN 3.40 (L) 11/11/2019    LABIL2 1.6 01/04/2019    AST 22 11/11/2019    ALT 14 11/11/2019       Lab Results   Component Value Date    TSH 1.090 11/09/2019     (H) 11/09/2019    HGBA1C 4.7 11/09/2019    HYTNTRLZ43 286 11/09/2019         IMAGING STUDIES:  Ct Angiogram Carotids    Result Date: 11/11/2019   1. 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 2. Right P2 segment returns to normal caliber without evidence of aneurysm. 3. Incompletely imaged ground glass irregular opacities within the lung apices.  Findings likely reflect infectious or inflammatory etiology.  Electronically Signed By-Job Zaldivar On:11/11/2019 12:10 PM This report was finalized on [id number] by  Job Zaldivar, .    Ct Angiogram Head    Result Date: 11/11/2019   1. 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 2. Right P2 segment returns to normal  caliber without evidence of aneurysm. 3. Incompletely imaged ground glass irregular opacities within the lung apices.  Findings likely reflect infectious or inflammatory etiology.  Electronically Signed By-Job Zaldivar On:11/11/2019 12:10 PM This report was finalized on [id number] by  Job Zaldivar, .      I reviewed the patient's new clinical results.    ________________________________________________      PROBLEM LIST:    Focal seizure (CMS/HCC)    Weakness    Intracranial atherosclerosis    Acute ischemic right MCA stroke (CMS/HCC)        Assessment/Plan   ASSESSMENT/PLAN:  1. Recurrent speech changes with abnormal EEG concerning for focal seizures.   - EEG: abnormal EEG which showed likely irritable foci left greater than right, central parietal   greater than other areas  - Labs: A1C: 4.7, B12: 286, LDL: 199, TSH: 1.09  - Increase carbamazepine and adjust dosing to 600mg BID  - Adjust zonisamide dosing to 200mg BID  - Resume home gabapentin. If pt becomes drowsy, recommend to decrease gabapentin slowly before adjusting other antiepileptics.   - Seizure precautions (see below)    2. Tiny subacute ischemic stroke with in the right frontal cortex. Although this may have contributed to the left facial weakness, this is a tiny stroke and would not likely cause her intermittent speech changes over the past week. She may have had TIAs as well.   - Images discussed with radiology and addendum added to MRI report  - ASA and statin added      SEIZURE/SYNCOPE INSTRUCTIONS:  -Recommended to observe all seizure precautions, including, but not limited to:   -No driving until seizure free for more than 3 months- as per State driving regulation / law;   -Avoid all high-risk activity, Take showers instead of baths, Avoid swimming without observation, Avoid open heat sources, Avoid working at heights, and Avoid engaging in all potentially hazardous activities.   -Patient expressed clear understanding.        **Please refer  to previous notes for further details and recommendations.     Will sign off. Please call with questions or concerns.       I discussed the patients findings and my recommendations with patient, family, nursing staff and consulting provider    RANDOLPH Burnett  11/12/19  11:35 AM

## 2019-11-12 NOTE — PLAN OF CARE
Problem: Patient Care Overview  Goal: Interprofessional Rounds/Family Conf  Outcome: Ongoing (interventions implemented as appropriate)      Problem: Fall Risk (Adult)  Goal: Identify Related Risk Factors and Signs and Symptoms  Outcome: Ongoing (interventions implemented as appropriate)      Problem: Activity Intolerance (Adult)  Goal: Identify Related Risk Factors and Signs and Symptoms  Outcome: Ongoing (interventions implemented as appropriate)    Goal: Effective Energy Conservation Techniques  Outcome: Ongoing (interventions implemented as appropriate)

## 2019-11-13 VITALS
RESPIRATION RATE: 16 BRPM | WEIGHT: 121.03 LBS | DIASTOLIC BLOOD PRESSURE: 57 MMHG | BODY MASS INDEX: 23.76 KG/M2 | OXYGEN SATURATION: 95 % | TEMPERATURE: 99.1 F | HEIGHT: 60 IN | HEART RATE: 61 BPM | SYSTOLIC BLOOD PRESSURE: 116 MMHG

## 2019-11-13 PROCEDURE — 99231 SBSQ HOSP IP/OBS SF/LOW 25: CPT | Performed by: INTERNAL MEDICINE

## 2019-11-13 RX ORDER — CARBAMAZEPINE 200 MG/1
600 TABLET ORAL 2 TIMES DAILY
Qty: 180 TABLET | Refills: 0 | Status: SHIPPED | OUTPATIENT
Start: 2019-11-13 | End: 2019-12-13

## 2019-11-13 RX ORDER — AMLODIPINE BESYLATE 5 MG/1
5 TABLET ORAL
Qty: 30 TABLET | Refills: 3 | Status: SHIPPED | OUTPATIENT
Start: 2019-11-14

## 2019-11-13 RX ORDER — ZONISAMIDE 100 MG/1
200 CAPSULE ORAL 2 TIMES DAILY
Qty: 120 CAPSULE | Refills: 1 | Status: SHIPPED | OUTPATIENT
Start: 2019-11-13

## 2019-11-13 RX ADMIN — ASPIRIN 81 MG 81 MG: 81 TABLET ORAL at 08:06

## 2019-11-13 RX ADMIN — PANTOPRAZOLE SODIUM 40 MG: 40 TABLET, DELAYED RELEASE ORAL at 06:23

## 2019-11-13 RX ADMIN — IBUPROFEN 800 MG: 400 TABLET ORAL at 14:21

## 2019-11-13 RX ADMIN — IBUPROFEN 800 MG: 400 TABLET ORAL at 06:23

## 2019-11-13 RX ADMIN — CYANOCOBALAMIN TAB 1000 MCG 1000 MCG: 1000 TAB at 08:06

## 2019-11-13 RX ADMIN — Medication 10 ML: at 08:08

## 2019-11-13 RX ADMIN — ZONISAMIDE 200 MG: 100 CAPSULE ORAL at 08:09

## 2019-11-13 RX ADMIN — GABAPENTIN 300 MG: 300 CAPSULE ORAL at 08:06

## 2019-11-13 RX ADMIN — POTASSIUM CHLORIDE 20 MEQ: 1500 TABLET, EXTENDED RELEASE ORAL at 08:05

## 2019-11-13 RX ADMIN — AMLODIPINE BESYLATE 5 MG: 5 TABLET ORAL at 08:06

## 2019-11-13 RX ADMIN — HYDRALAZINE HYDROCHLORIDE 100 MG: 25 TABLET, FILM COATED ORAL at 08:07

## 2019-11-13 RX ADMIN — CARBAMAZEPINE 600 MG: 200 TABLET ORAL at 08:05

## 2019-11-13 NOTE — PROGRESS NOTES
LOS: 2 days   Admiting Physician- Pavan Spears MD    Reason For Followup:    Generalized weakness  Possible stroke  Near-syncope    Subjective     Patient is feeling better    Objective     No dyspnea    Review of Systems:   Review of Systems   Constitution: Negative for chills and fever.   HENT: Negative for ear discharge and nosebleeds.    Eyes: Negative for discharge and redness.   Cardiovascular: Negative for chest pain, orthopnea, palpitations, paroxysmal nocturnal dyspnea and syncope.   Respiratory: Negative for cough, shortness of breath and wheezing.    Endocrine: Negative for heat intolerance.   Skin: Negative for rash.   Musculoskeletal: Positive for arthritis and joint pain. Negative for myalgias.   Gastrointestinal: Negative for abdominal pain, melena, nausea and vomiting.   Genitourinary: Negative for dysuria and hematuria.   Neurological: Negative for dizziness, light-headedness, numbness and tremors.   Psychiatric/Behavioral: Negative for depression. The patient is not nervous/anxious.          Vital Signs  Vitals:    11/12/19 0613 11/12/19 1044 11/12/19 1458 11/12/19 1844   BP: 164/66 171/64 153/69 161/69   BP Location: Right arm Right arm Left arm Left arm   Patient Position: Lying Sitting Sitting Sitting   Pulse: 67 59 62 68   Resp: 21 21 18 16   Temp: 98.7 °F (37.1 °C) 98.1 °F (36.7 °C) 98.9 °F (37.2 °C) 98.7 °F (37.1 °C)   TempSrc: Oral Oral Oral Oral   SpO2: 94% 96% 96% 97%   Weight: 52.9 kg (116 lb 10 oz)      Height:         Wt Readings from Last 1 Encounters:   11/12/19 52.9 kg (116 lb 10 oz)       Intake/Output Summary (Last 24 hours) at 11/12/2019 1916  Last data filed at 11/12/2019 1844  Gross per 24 hour   Intake 600 ml   Output 500 ml   Net 100 ml     Physical Exam:  Physical Exam   Constitutional: She is oriented to person, place, and time. She appears well-developed and well-nourished.   HENT:   Head: Normocephalic and atraumatic.   Eyes: No scleral icterus.   Neck: No  thyromegaly present.   Cardiovascular: Normal rate, regular rhythm and normal heart sounds. Exam reveals no gallop and no friction rub.   No murmur heard.  Pulmonary/Chest: Effort normal and breath sounds normal. No respiratory distress. She has no wheezes. She has no rales.   Abdominal: There is no tenderness.   Musculoskeletal: She exhibits no edema.   Lymphadenopathy:     She has no cervical adenopathy.   Neurological: She is alert and oriented to person, place, and time.   Skin: No rash noted. No erythema.   Psychiatric: She has a normal mood and affect.       Results Review:   Lab Results (last 24 hours)     Procedure Component Value Units Date/Time    Zonisamide Level [[id number]] Collected:  11/09/19 1023    Specimen:  Blood Updated:  11/12/19 1110     Zonisamide 20.6 ug/mL      Comment: This test was developed and its performance characteristics  determined by [facility]. It has not been cleared or approved  by the Food and Drug Administration.                                  Detection Limit = 1.0       Narrative:       Performed at:  [address] [place] [address]  [id number]  : Ryan Menon MD, Phone:  [phone]    POC Glucose Once [[id number]]  (Abnormal) Collected:  11/08/19 1521    Specimen:  Blood Updated:  11/12/19 0845     Glucose 116 mg/dL      Comment: Serial Number: [id number]:  [id number]       Basic Metabolic Panel [[id number]]  (Abnormal) Collected:  11/12/19 0250    Specimen:  Blood Updated:  11/12/19 0355     Glucose 94 mg/dL      BUN 10 mg/dL      Creatinine 0.45 mg/dL      Sodium 141 mmol/L      Potassium 4.1 mmol/L      Chloride 105 mmol/L      CO2 26.0 mmol/L      Calcium 8.5 mg/dL      eGFR Non African Amer 132 mL/min/1.73      BUN/Creatinine Ratio 22.2     Anion Gap 10.0 mmol/L     Narrative:       GFR Normal >60  Chronic Kidney Disease <60  Kidney Failure <15    CBC (No Diff) [[id number]]  (Abnormal) Collected:  11/12/19 0250    Specimen:  Blood  Updated:  11/12/19 0337     WBC 4.70 10*3/mm3      RBC 2.72 10*6/mm3      Hemoglobin 9.4 g/dL      Hematocrit 28.0 %      .2 fL      MCH 34.7 pg      MCHC 33.7 g/dL      RDW 13.5 %      RDW-SD 48.6 fl      MPV 8.8 fL      Platelets 191 10*3/mm3         Imaging Results (Last 72 Hours)     Procedure Component Value Units Date/Time    MRI Brain Without Contrast [[id number]] Collected:  11/09/19 1232     Updated:  11/12/19 0933    Addenda:        This examination was reviewed with nurse practitioner Monica Lorenz.  There is a very tiny, 3 mm focus of restricted diffusion along the  cortex of the right frontal lobe along the sylvian fissure. There is  minimal increased FLAIR signal intensity this location. This is  suspicious for a very tiny subacute infarct.     Electronically Signed By-Bri Rivas MD On:11/12/2019 9:31 AM  This report was finalized on [id number] by  Bri Rivas MD.  Signed:  11/12/19 0931 by Bri Rivas MD    Narrative:       MRI BRAIN WO CONTRAST-     Date of Exam: 11/9/2019 11:42 AM     Indication: Confusion/delirium, altered LOC, unexplained;  R53.1-Weakness; R47.81-Slurred speech; W19.XXXA-Unspecified fall,  initial encounter; E87.6-Hypokalemia     Comparison: CT head without contrast 11/08/2019     Technique: Multiplanar multisequence images of the brain were performed  without contrast according to routine brain MRI protocol.     FINDINGS:  No restricted diffusion to suggest acute or subacute infarct.  No  intracranial mass, midline shift, intracranial hemorrhage, or pathologic  extraaxial fluid collection.  The ventricular system is nondilated.   Brain volume is normal for patient's age.  The gray and white matter  signal characteristics are normal. Small calcifications causing mild  blooming within the parenchyma of the cerebellum and less sparse in the  frontal and parietal lobes. Likely to remote insult/infection. These  were seen on CT. There is a moderate amount of  hyperintense FLAIR and T2  signal intensity in the periventricular and subcortical white matter.  This is nonspecific, but most commonly seen with chronic small vessel  ischemic changes.  The vertebral arteries, basilar artery and internal carotid arteries  appear large. Normal flow-voids are seen in these vessels on T2-weighted  sequences. There is some fluid within the inferior left mastoid air  cells. The globes and extraocular muscles are normal.  No cerebellar  pontine angle masses.  Craniocervical junction is normal.  Pituitary  gland has normal size and signal intensity for patient's age and gender.          Impression:       1.No restricted diffusion to suggest acute or subacute infarct.  2.Moderate amount of hyperintense FLAIR and T2 signal intensity  periventricular and subcortical white matter. This is nonspecific, but  most commonly seen with chronic small vessel ischemic changes.  3.Left mastoid air cell effusion. Correlate for mastoiditis.        Electronically Signed By-Bri Rivas MD On:11/9/2019 12:37 PM  This report was finalized on [id number] by  Bri Rivas MD.    CT Angiogram Carotids [[id number]] Collected:  11/11/19 1202     Updated:  11/11/19 1409    Narrative:          DATE OF EXAM:  11/10/2019 3:51 PM     PROCEDURE:  CT ANGIOGRAM CAROTIDS-     INDICATIONS:   Aneurysm, neck vessel(s); R53.1-Weakness; R47.81-Slurred speech;  W19.XXXA-Unspecified fall, initial encounter; E87.6-Hypokalemia     COMPARISON:   No Comparisons Available     TECHNIQUE:  CTA of the head and CTA of the neck were performed after the intravenous  administration of 100 mL Isovue 370. Reconstructed coronal and sagittal  images were also obtained. In addition, a 3 D volume rendered image was  obtained after post processing. Automated exposure control and iterative  reconstruction methods were used.      FINDINGS:  VASCULAR FINDINGS: The aortic arch is normal in caliber. The right  brachiocephalic artery is widely patent.  The right common carotid artery  is widely patent. There is minimal calcific plaquing within the right  proximal ICA. No measurable stenosis by criteria. The remainder of the  right ICA is widely patent. The right subclavian and right vertebral  artery are widely patent. There is mild calcific plaquing of the distal  vertebral arteries. Left common carotid artery arises from the left  brachiocephalic artery, a normal variant, bovine arch. There is calcific  plaquing in the left proximal ICA, but again no measurable stenosis by  NASCET criteria. The remainder of the left ICA is widely patent. The  left subclavian artery and left vertebral artery are widely patent. The  distal ICAs demonstrate peripheral calcification, but no measurable  stenosis. The right A1 segment is very small, a normal variant. The  remainder of the bilateral anterior cerebral arteries are widely patent.  The bilateral MCAs are widely patent. The distal basilar artery is  widely patent. There is moderate stenosis of the right P1 segment. There  is no identifiable right posterior communicating artery. The right P2  segment is normal in caliber without evidence of aneurysm identified.  The bilateral PCAs appear widely patent.     NONVASCULAR FINDINGS: Limited evaluation of the lung apices demonstrates  irregular right upper lobe and left upper lobe groundglass opacity,  incompletely imaged. No adenopathy is identified. Thyroid is  unremarkable. Bilateral parotid glands are unremarkable. Brain  parenchyma is unremarkable. Orbits paranasal sinuses and mastoid air  cells are normal. No aggressive appearing lytic or sclerotic bone  lesions are identified.          Impression:          1. 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  2. Right P2 segment returns to normal caliber without evidence of  aneurysm.  3. Incompletely imaged ground glass irregular opacities within the lung  apices.  Findings likely reflect infectious or  inflammatory etiology.     Electronically Signed By-Job Zaldivar On:11/11/2019 12:10 PM  This report was finalized on [id number] by  Job Zaldivar, .    CT Angiogram Head [[id number]] Collected:  11/11/19 1202     Updated:  11/11/19 1409    Narrative:          DATE OF EXAM:  11/10/2019 3:51 PM     PROCEDURE:  CT ANGIOGRAM CAROTIDS-     INDICATIONS:   Aneurysm, neck vessel(s); R53.1-Weakness; R47.81-Slurred speech;  W19.XXXA-Unspecified fall, initial encounter; E87.6-Hypokalemia     COMPARISON:   No Comparisons Available     TECHNIQUE:  CTA of the head and CTA of the neck were performed after the intravenous  administration of 100 mL Isovue 370. Reconstructed coronal and sagittal  images were also obtained. In addition, a 3 D volume rendered image was  obtained after post processing. Automated exposure control and iterative  reconstruction methods were used.      FINDINGS:  VASCULAR FINDINGS: The aortic arch is normal in caliber. The right  brachiocephalic artery is widely patent. The right common carotid artery  is widely patent. There is minimal calcific plaquing within the right  proximal ICA. No measurable stenosis by criteria. The remainder of the  right ICA is widely patent. The right subclavian and right vertebral  artery are widely patent. There is mild calcific plaquing of the distal  vertebral arteries. Left common carotid artery arises from the left  brachiocephalic artery, a normal variant, bovine arch. There is calcific  plaquing in the left proximal ICA, but again no measurable stenosis by  NASCET criteria. The remainder of the left ICA is widely patent. The  left subclavian artery and left vertebral artery are widely patent. The  distal ICAs demonstrate peripheral calcification, but no measurable  stenosis. The right A1 segment is very small, a normal variant. The  remainder of the bilateral anterior cerebral arteries are widely patent.  The bilateral MCAs are widely patent. The distal basilar artery  is  widely patent. There is moderate stenosis of the right P1 segment. There  is no identifiable right posterior communicating artery. The right P2  segment is normal in caliber without evidence of aneurysm identified.  The bilateral PCAs appear widely patent.     NONVASCULAR FINDINGS: Limited evaluation of the lung apices demonstrates  irregular right upper lobe and left upper lobe groundglass opacity,  incompletely imaged. No adenopathy is identified. Thyroid is  unremarkable. Bilateral parotid glands are unremarkable. Brain  parenchyma is unremarkable. Orbits paranasal sinuses and mastoid air  cells are normal. No aggressive appearing lytic or sclerotic bone  lesions are identified.          Impression:          1. 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  2. Right P2 segment returns to normal caliber without evidence of  aneurysm.  3. Incompletely imaged ground glass irregular opacities within the lung  apices.  Findings likely reflect infectious or inflammatory etiology.     Electronically Signed By-Job Zaldivar On:11/11/2019 12:10 PM  This report was finalized on [id number] by  Job Zaldivar, .        ECG/EMG Results (most recent)     Procedure Component Value Units Date/Time    ECG 12 Lead [[id number]] Collected:  11/08/19 1536     Updated:  11/09/19 0844    Narrative:       HEART RATE= 70  bpm  RR Interval= 860  ms  OH Interval= 164  ms  P Horizontal Axis= -39  deg  P Front Axis= 62  deg  QRSD Interval= 99  ms  QT Interval= 397  ms  QRS Axis= -17  deg  T Wave Axis= 103  deg  - ABNORMAL ECG -  Sinus rhythm  Ventricular premature complex  Anterolateral infarct, old  When compared with ECG of 08-Feb-2018 11:41:03,  No significant change  Electronically Signed By: Jed Sweeney (Binu) 09-Nov-2019 08:43:49  Date and Time of Study: 2019-11-08 15:36:29    Adult Transthoracic Echo Complete W/ Cont if Necessary Per Protocol [[id number]] Collected:  11/09/19 1425     Updated:  11/10/19 8932      BSA 1.5 m^2       CV ECHO JOHN - RVDD 2.7 cm      IVSd 1.2 cm      IVSs 2.0 cm      LVIDd 4.6 cm      LVIDs 2.5 cm      LVPWd 1.0 cm       CV ECHO JOHN - LVPWS 1.6 cm      IVS/LVPW 1.2     FS 45.4 %      EDV(Teich) 96.1 ml      ESV(Teich) 22.2 ml      EF(Teich) 76.9 %      EDV(cubed) 95.8 ml      ESV(cubed) 15.6 ml      EF(cubed) 83.8 %      % IVS thick 63.7 %      % LVPW thick 55.6 %      LV mass(C)d 190.0 grams      LV mass(C)dI 128.8 grams/m^2      LV mass(C)s 181.9 grams      LV mass(C)sI 123.3 grams/m^2      SV(Teich) 73.9 ml      SI(Teich) 50.1 ml/m^2      SV(cubed) 80.2 ml      SI(cubed) 54.4 ml/m^2      Ao root diam 3.1 cm      Ao root area 7.7 cm^2      ACS 1.8 cm      LVOT diam 1.8 cm      LVOT area 2.6 cm^2      RVOT diam 1.7 cm      RVOT area 2.3 cm^2      EDV(MOD-sp4) 43.1 ml      ESV(MOD-sp4) 14.4 ml      EF(MOD-sp4) 66.5 %      EDV(MOD-sp2) 50.4 ml      ESV(MOD-sp2) 12.1 ml      EF(MOD-sp2) 76.0 %      SV(MOD-sp4) 28.7 ml      SI(MOD-sp4) 19.4 ml/m^2      SV(MOD-sp2) 38.3 ml      SI(MOD-sp2) 26.0 ml/m^2      Ao root area (BSA corrected) 2.1     LV Adames Vol (BSA corrected) 29.2 ml/m^2      LV Sys Vol (BSA corrected) 9.8 ml/m^2      MV E max priscilla 64.3 cm/sec      MV A max priscilla 123.6 cm/sec      MV E/A 0.52     MV V2 max 136.8 cm/sec      MV max PG 7.5 mmHg      MV V2 mean 78.8 cm/sec      MV mean PG 3.0 mmHg      MV V2 VTI 40.3 cm      MVA(VTI) 1.9 cm^2      MV dec slope 558.1 cm/sec^2      MV dec time 0.23 sec      Ao pk priscilla 195.4 cm/sec      Ao max PG 15.4 mmHg      Ao max PG (full) 5.7 mmHg      Ao V2 mean 126.4 cm/sec      Ao mean PG 7.4 mmHg      Ao mean PG (full) 2.4 mmHg      Ao V2 VTI 40.1 cm      DYANA(I,A) 1.9 cm^2      DYANA(I,D) 1.9 cm^2      DYANA(V,A) 2.1 cm^2      DYANA(V,D) 2.1 cm^2      LV V1 max PG 9.7 mmHg      LV V1 mean PG 4.9 mmHg      LV V1 max 155.7 cm/sec      LV V1 mean 102.5 cm/sec      LV V1 VTI 29.1 cm      SV(Ao) 308.1 ml      SI(Ao) 208.8 ml/m^2      SV(LVOT) 77.0 ml       SV(RVOT) 43.4 ml      SI(LVOT) 52.2 ml/m^2      PA V2 max 125.1 cm/sec      PA max PG 6.3 mmHg      PA max PG (full) 0.55 mmHg      PA V2 mean 82.7 cm/sec      PA mean PG 3.2 mmHg      PA mean PG (full) 0.46 mmHg      PA V2 VTI 21.5 cm      PVA(I,A) 2.0 cm^2       CV ECHO JOHN - PVA(I,D) 2.0 cm^2       CV ECHO JOHN - PVA(V,A) 2.2 cm^2       CV ECHO JOHN - PVA(V,D) 2.2 cm^2      PA acc time 0.09 sec      RV V1 max PG 5.7 mmHg      RV V1 mean PG 2.7 mmHg      RV V1 max 119.6 cm/sec      RV V1 mean 72.3 cm/sec      RV V1 VTI 18.8 cm      TR max priscilla 279.9 cm/sec      RVSP(TR) 34.3 mmHg      RAP systole 3.0 mmHg      PA pr(Accel) 40.1 mmHg      Qp/Qs 0.56      CV ECHO JOHN - BZI_BMI 22.5 kilograms/m^2       CV ECHO JOHN - BSA(HAYCOCK) 1.5 m^2       CV ECHO JOHN - BZI_METRIC_WEIGHT 52.2 kg       CV ECHO JOHN - BZI_METRIC_HEIGHT 152.4 cm      Target HR (85%) 113 bpm      Max. Pred. HR (100%) 133 bpm      EF(MOD-bp) 70.0 %      LA dimension(2D) 3.9 cm     Narrative:       · Left ventricular systolic function is normal.  · There is calcification of the aortic valve.  · Mild mitral valve regurgitation is present  · Mild tricuspid valve regurgitation is present.  · LV ejection fraction is about 65 to 70%  · No pericardial effusion noted             Cardiac Studies:  Echo-   Results for orders placed during the hospital encounter of 11/08/19   Adult Transthoracic Echo Complete W/ Cont if Necessary Per Protocol    Narrative · Left ventricular systolic function is normal.  · There is calcification of the aortic valve.  · Mild mitral valve regurgitation is present  · Mild tricuspid valve regurgitation is present.  · LV ejection fraction is about 65 to 70%  · No pericardial effusion noted        Stress Myoview-  Cath-      Medication Review:   Scheduled Meds:  aspirin 81 mg Oral Daily   atorvastatin 20 mg Oral Nightly   carBAMazepine 600 mg Oral BID   cefTRIAXone 1 g Intravenous Q24H   gabapentin 300 mg Oral TID    hydrALAZINE 100 mg Oral Q12H   ibuprofen 800 mg Oral Q8H   pantoprazole 40 mg Oral QAM   potassium chloride 20 mEq Oral Daily   sodium chloride 10 mL Intravenous Q12H   vitamin B-12 1,000 mcg Oral Daily   zonisamide 200 mg Oral BID     Continuous Infusions:   PRN Meds:.influenza vaccine  •  sodium chloride  •  sodium chloride      Assessment/Plan   Patient Active Problem List   Diagnosis   • Weakness   • Acute exacerbation of chronic obstructive pulmonary disease (COPD) (CMS/HCC)   • Common peroneal neuropathy   • Degeneration of lumbar or lumbosacral intervertebral disc   • Lumbar radiculopathy   • Spinal stenosis of lumbar region   • Intracranial atherosclerosis   • Acute ischemic right MCA stroke (CMS/HCC)   • Focal seizure (CMS/[facility])     Patient is seen and examined and findings are verified.  Patient is lying comfortably in bed.  Patient underwent EEG and it showed possible seizure activity.  Her blood pressure is elevated.  Patient is on hydralazine.  I would start low-dose Norvasc.  We shall follow    Grayson Herzog MD  11/12/19  7:16 PM

## 2019-11-13 NOTE — PROGRESS NOTES
Please refer to previously dictated discharge summary:  Patient was held an additional night secondary to insurance paperwork issues.  She is stable for transport to [facility] and we will follow-up with her as an outpatient.

## 2019-11-13 NOTE — PLAN OF CARE
Problem: Patient Care Overview  Goal: Plan of Care Review  Outcome: Ongoing (interventions implemented as appropriate)   11/13/19 4935   Coping/Psychosocial   Plan of Care Reviewed With patient   OTHER   Outcome Summary Pt is ambulating to [facility] with assistx1. No complaints of pain. Pt to D/C to [facility] for In pt rehab in am.       Problem: Fall Risk (Adult)  Goal: Identify Related Risk Factors and Signs and Symptoms  Outcome: Ongoing (interventions implemented as appropriate)      Problem: Activity Intolerance (Adult)  Goal: Identify Related Risk Factors and Signs and Symptoms  Outcome: Ongoing (interventions implemented as appropriate)    Goal: Effective Energy Conservation Techniques  Outcome: Ongoing (interventions implemented as appropriate)

## 2019-11-14 ENCOUNTER — LAB REQUISITION (OUTPATIENT)
Dept: LAB | Facility: HOSPITAL | Age: 84
End: 2019-11-14

## 2019-11-14 DIAGNOSIS — Z00.00 ENCOUNTER FOR GENERAL ADULT MEDICAL EXAMINATION WITHOUT ABNORMAL FINDINGS: ICD-10-CM

## 2019-11-14 LAB
ANION GAP SERPL CALCULATED.3IONS-SCNC: 10 MMOL/L (ref 5–15)
BASOPHILS # BLD AUTO: 0 10*3/MM3 (ref 0–0.2)
BASOPHILS NFR BLD AUTO: 0.7 % (ref 0–1.5)
BUN BLD-MCNC: 13 MG/DL (ref 8–23)
BUN/CREAT SERPL: 22 (ref 7–25)
CALCIUM SPEC-SCNC: 8.6 MG/DL (ref 8.6–10.5)
CARBAMAZEPINE FREE SERPL-MCNC: 2.2 UG/ML (ref 0.6–4.2)
CARBAMAZEPINE SERPL-MCNC: 7 UG/ML (ref 4–12)
CHLORIDE SERPL-SCNC: 99 MMOL/L (ref 98–107)
CO2 SERPL-SCNC: 25 MMOL/L (ref 22–29)
CREAT BLD-MCNC: 0.59 MG/DL (ref 0.57–1)
DEPRECATED RDW RBC AUTO: 48.6 FL (ref 37–54)
EOSINOPHIL # BLD AUTO: 0.2 10*3/MM3 (ref 0–0.4)
EOSINOPHIL NFR BLD AUTO: 2.5 % (ref 0.3–6.2)
ERYTHROCYTE [DISTWIDTH] IN BLOOD BY AUTOMATED COUNT: 13.4 % (ref 12.3–15.4)
GFR SERPL CREATININE-BSD FRML MDRD: 96 ML/MIN/1.73
GLUCOSE BLD-MCNC: 102 MG/DL (ref 65–99)
HCT VFR BLD AUTO: 32.7 % (ref 34–46.6)
HGB BLD-MCNC: 10.9 G/DL (ref 12–15.9)
LYMPHOCYTES # BLD AUTO: 1.3 10*3/MM3 (ref 0.7–3.1)
LYMPHOCYTES NFR BLD AUTO: 19.1 % (ref 19.6–45.3)
MCH RBC QN AUTO: 34.4 PG (ref 26.6–33)
MCHC RBC AUTO-ENTMCNC: 33.2 G/DL (ref 31.5–35.7)
MCV RBC AUTO: 103.5 FL (ref 79–97)
MONOCYTES # BLD AUTO: 0.6 10*3/MM3 (ref 0.1–0.9)
MONOCYTES NFR BLD AUTO: 9.1 % (ref 5–12)
NEUTROPHILS # BLD AUTO: 4.8 10*3/MM3 (ref 1.7–7)
NEUTROPHILS NFR BLD AUTO: 68.6 % (ref 42.7–76)
NRBC BLD AUTO-RTO: 0.1 /100 WBC (ref 0–0.2)
PLATELET # BLD AUTO: 275 10*3/MM3 (ref 140–450)
PMV BLD AUTO: 8.9 FL (ref 6–12)
POTASSIUM BLD-SCNC: 4.3 MMOL/L (ref 3.5–5.2)
RBC # BLD AUTO: 3.16 10*6/MM3 (ref 3.77–5.28)
SODIUM BLD-SCNC: 134 MMOL/L (ref 136–145)
WBC NRBC COR # BLD: 6.9 10*3/MM3 (ref 3.4–10.8)

## 2019-11-14 PROCEDURE — 80048 BASIC METABOLIC PNL TOTAL CA: CPT

## 2019-11-14 PROCEDURE — 85025 COMPLETE CBC W/AUTO DIFF WBC: CPT

## 2019-11-14 NOTE — PROGRESS NOTES
Case Management Discharge Note    Final Note: [facility] - Subacute          Final Discharge Disposition Code: 03 - skilled nursing facility (SNF)

## 2019-11-14 NOTE — PROGRESS NOTES
LOS: 3 days   Admiting Physician- No att. providers found    Reason For Followup:    Generalized weakness  Possible stroke  Near-syncope    Subjective     Patient is overall stable.  Denies any chest pain or shortness of breath.    Objective     No dyspnea    Review of Systems:   Review of Systems   Constitution: Negative for chills and fever.   HENT: Negative for ear discharge and nosebleeds.    Eyes: Negative for discharge and redness.   Cardiovascular: Negative for chest pain, orthopnea, palpitations, paroxysmal nocturnal dyspnea and syncope.   Respiratory: Negative for cough, shortness of breath and wheezing.    Endocrine: Negative for heat intolerance.   Skin: Negative for rash.   Musculoskeletal: Positive for arthritis and joint pain. Negative for myalgias.   Gastrointestinal: Negative for abdominal pain, melena, nausea and vomiting.   Genitourinary: Negative for dysuria and hematuria.   Neurological: Negative for dizziness, light-headedness, numbness and tremors.   Psychiatric/Behavioral: Negative for depression. The patient is not nervous/anxious.          Vital Signs  Vitals:    11/13/19 0617 11/13/19 0807 11/13/19 1048 11/13/19 1505   BP: 111/75 153/61 107/47 116/57   BP Location: Left arm  Left arm Left arm   Patient Position: Lying  Sitting Lying   Pulse: 70 61     Resp: 15  16 16   Temp: 98.5 °F (36.9 °C)  98.1 °F (36.7 °C) 99.1 °F (37.3 °C)   TempSrc: Oral  Oral Oral   SpO2: 95%  96% 95%   Weight: 54.9 kg (121 lb 0.5 oz)      Height:         Wt Readings from Last 1 Encounters:   11/13/19 54.9 kg (121 lb 0.5 oz)       Intake/Output Summary (Last 24 hours) at 11/13/2019 2157  Last data filed at 11/13/2019 1048  Gross per 24 hour   Intake 240 ml   Output --   Net 240 ml     Physical Exam:  Physical Exam   Constitutional: She is oriented to person, place, and time. She appears well-developed and well-nourished.   HENT:   Head: Normocephalic and atraumatic.   Eyes: No scleral icterus.   Neck: No  thyromegaly present.   Cardiovascular: Normal rate, regular rhythm and normal heart sounds. Exam reveals no gallop and no friction rub.   No murmur heard.  Pulmonary/Chest: Effort normal and breath sounds normal. No respiratory distress. She has no wheezes. She has no rales.   Abdominal: There is no tenderness.   Musculoskeletal: She exhibits no edema.   Lymphadenopathy:     She has no cervical adenopathy.   Neurological: She is alert and oriented to person, place, and time.   Skin: No rash noted. No erythema.   Psychiatric: She has a normal mood and affect.       Results Review:   Lab Results (last 24 hours)     Procedure Component Value Units Date/Time    Carbamazepine Level, Free & Total [[id number]] Collected:  11/09/19 1023    Specimen:  Blood Updated:  11/13/19 1501     Carbamazepine Level 7.0 ug/mL      Comment:          In conjunction with other antiepileptic drugs                                 Therapeutic  4.0 -  8.0                                 Toxicity     9.0 - 12.0                                     Carbamazepine alone                                 Therapeutic  8.0 - 12.0                                  Detection Limit =  0.5                            <0.5 indicated None Detected        Carbamazepine, Free 2.2 ug/mL      Comment:                                 Detection Limit = 0.5       Narrative:       Performed at:  [address] [place] [address]  [id number]  : Ryan Menon MD, Phone:  [phone]        Imaging Results (Last 72 Hours)     Procedure Component Value Units Date/Time    MRI Brain Without Contrast [[id number]] Collected:  11/09/19 1232     Updated:  11/12/19 0933    Addenda:        This examination was reviewed with nurse practitioner Monica Lorenz.  There is a very tiny, 3 mm focus of restricted diffusion along the  cortex of the right frontal lobe along the sylvian fissure. There is  minimal increased FLAIR signal intensity this  location. This is  suspicious for a very tiny subacute infarct.     Electronically Signed By-Bri Rivas MD On:11/12/2019 9:31 AM  This report was finalized on [id number] by  Bri Rivas MD.  Signed:  11/12/19 0931 by Bri Rivas MD    Narrative:       MRI BRAIN WO CONTRAST-     Date of Exam: 11/9/2019 11:42 AM     Indication: Confusion/delirium, altered LOC, unexplained;  R53.1-Weakness; R47.81-Slurred speech; W19.XXXA-Unspecified fall,  initial encounter; E87.6-Hypokalemia     Comparison: CT head without contrast 11/08/2019     Technique: Multiplanar multisequence images of the brain were performed  without contrast according to routine brain MRI protocol.     FINDINGS:  No restricted diffusion to suggest acute or subacute infarct.  No  intracranial mass, midline shift, intracranial hemorrhage, or pathologic  extraaxial fluid collection.  The ventricular system is nondilated.   Brain volume is normal for patient's age.  The gray and white matter  signal characteristics are normal. Small calcifications causing mild  blooming within the parenchyma of the cerebellum and less sparse in the  frontal and parietal lobes. Likely to remote insult/infection. These  were seen on CT. There is a moderate amount of hyperintense FLAIR and T2  signal intensity in the periventricular and subcortical white matter.  This is nonspecific, but most commonly seen with chronic small vessel  ischemic changes.  The vertebral arteries, basilar artery and internal carotid arteries  appear large. Normal flow-voids are seen in these vessels on T2-weighted  sequences. There is some fluid within the inferior left mastoid air  cells. The globes and extraocular muscles are normal.  No cerebellar  pontine angle masses.  Craniocervical junction is normal.  Pituitary  gland has normal size and signal intensity for patient's age and gender.          Impression:       1.No restricted diffusion to suggest acute or subacute infarct.  2.Moderate  amount of hyperintense FLAIR and T2 signal intensity  periventricular and subcortical white matter. This is nonspecific, but  most commonly seen with chronic small vessel ischemic changes.  3.Left mastoid air cell effusion. Correlate for mastoiditis.        Electronically Signed By-Bri Rivas MD On:11/9/2019 12:37 PM  This report was finalized on [id number] by  Bri Rivas MD.    CT Angiogram Carotids [[id number]] Collected:  11/11/19 1202     Updated:  11/11/19 1409    Narrative:          DATE OF EXAM:  11/10/2019 3:51 PM     PROCEDURE:  CT ANGIOGRAM CAROTIDS-     INDICATIONS:   Aneurysm, neck vessel(s); R53.1-Weakness; R47.81-Slurred speech;  W19.XXXA-Unspecified fall, initial encounter; E87.6-Hypokalemia     COMPARISON:   No Comparisons Available     TECHNIQUE:  CTA of the head and CTA of the neck were performed after the intravenous  administration of 100 mL Isovue 370. Reconstructed coronal and sagittal  images were also obtained. In addition, a 3 D volume rendered image was  obtained after post processing. Automated exposure control and iterative  reconstruction methods were used.      FINDINGS:  VASCULAR FINDINGS: The aortic arch is normal in caliber. The right  brachiocephalic artery is widely patent. The right common carotid artery  is widely patent. There is minimal calcific plaquing within the right  proximal ICA. No measurable stenosis by criteria. The remainder of the  right ICA is widely patent. The right subclavian and right vertebral  artery are widely patent. There is mild calcific plaquing of the distal  vertebral arteries. Left common carotid artery arises from the left  brachiocephalic artery, a normal variant, bovine arch. There is calcific  plaquing in the left proximal ICA, but again no measurable stenosis by  NASCET criteria. The remainder of the left ICA is widely patent. The  left subclavian artery and left vertebral artery are widely patent. The  distal ICAs demonstrate peripheral  calcification, but no measurable  stenosis. The right A1 segment is very small, a normal variant. The  remainder of the bilateral anterior cerebral arteries are widely patent.  The bilateral MCAs are widely patent. The distal basilar artery is  widely patent. There is moderate stenosis of the right P1 segment. There  is no identifiable right posterior communicating artery. The right P2  segment is normal in caliber without evidence of aneurysm identified.  The bilateral PCAs appear widely patent.     NONVASCULAR FINDINGS: Limited evaluation of the lung apices demonstrates  irregular right upper lobe and left upper lobe groundglass opacity,  incompletely imaged. No adenopathy is identified. Thyroid is  unremarkable. Bilateral parotid glands are unremarkable. Brain  parenchyma is unremarkable. Orbits paranasal sinuses and mastoid air  cells are normal. No aggressive appearing lytic or sclerotic bone  lesions are identified.          Impression:          1. 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  2. Right P2 segment returns to normal caliber without evidence of  aneurysm.  3. Incompletely imaged ground glass irregular opacities within the lung  apices.  Findings likely reflect infectious or inflammatory etiology.     Electronically Signed By-Job Zaldivar On:11/11/2019 12:10 PM  This report was finalized on [id number] by  Jbo Zaldivar, .    CT Angiogram Head [[id number]] Collected:  11/11/19 1202     Updated:  11/11/19 1409    Narrative:          DATE OF EXAM:  11/10/2019 3:51 PM     PROCEDURE:  CT ANGIOGRAM CAROTIDS-     INDICATIONS:   Aneurysm, neck vessel(s); R53.1-Weakness; R47.81-Slurred speech;  W19.XXXA-Unspecified fall, initial encounter; E87.6-Hypokalemia     COMPARISON:   No Comparisons Available     TECHNIQUE:  CTA of the head and CTA of the neck were performed after the intravenous  administration of 100 mL Isovue 370. Reconstructed coronal and sagittal  images were also  obtained. In addition, a 3 D volume rendered image was  obtained after post processing. Automated exposure control and iterative  reconstruction methods were used.      FINDINGS:  VASCULAR FINDINGS: The aortic arch is normal in caliber. The right  brachiocephalic artery is widely patent. The right common carotid artery  is widely patent. There is minimal calcific plaquing within the right  proximal ICA. No measurable stenosis by criteria. The remainder of the  right ICA is widely patent. The right subclavian and right vertebral  artery are widely patent. There is mild calcific plaquing of the distal  vertebral arteries. Left common carotid artery arises from the left  brachiocephalic artery, a normal variant, bovine arch. There is calcific  plaquing in the left proximal ICA, but again no measurable stenosis by  NASCET criteria. The remainder of the left ICA is widely patent. The  left subclavian artery and left vertebral artery are widely patent. The  distal ICAs demonstrate peripheral calcification, but no measurable  stenosis. The right A1 segment is very small, a normal variant. The  remainder of the bilateral anterior cerebral arteries are widely patent.  The bilateral MCAs are widely patent. The distal basilar artery is  widely patent. There is moderate stenosis of the right P1 segment. There  is no identifiable right posterior communicating artery. The right P2  segment is normal in caliber without evidence of aneurysm identified.  The bilateral PCAs appear widely patent.     NONVASCULAR FINDINGS: Limited evaluation of the lung apices demonstrates  irregular right upper lobe and left upper lobe groundglass opacity,  incompletely imaged. No adenopathy is identified. Thyroid is  unremarkable. Bilateral parotid glands are unremarkable. Brain  parenchyma is unremarkable. Orbits paranasal sinuses and mastoid air  cells are normal. No aggressive appearing lytic or sclerotic bone  lesions are identified.           Impression:          1. 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  2. Right P2 segment returns to normal caliber without evidence of  aneurysm.  3. Incompletely imaged ground glass irregular opacities within the lung  apices.  Findings likely reflect infectious or inflammatory etiology.     Electronically Signed By-Job Zaldivar On:11/11/2019 12:10 PM  This report was finalized on [id number] by  Job Zaldivar, .        ECG/EMG Results (most recent)     Procedure Component Value Units Date/Time    ECG 12 Lead [[id number]] Collected:  11/08/19 1536     Updated:  11/09/19 0844    Narrative:       HEART RATE= 70  bpm  RR Interval= 860  ms  AR Interval= 164  ms  P Horizontal Axis= -39  deg  P Front Axis= 62  deg  QRSD Interval= 99  ms  QT Interval= 397  ms  QRS Axis= -17  deg  T Wave Axis= 103  deg  - ABNORMAL ECG -  Sinus rhythm  Ventricular premature complex  Anterolateral infarct, old  When compared with ECG of 08-Feb-2018 11:41:03,  No significant change  Electronically Signed By: Jed Sweeney ([facility]) 09-Nov-2019 08:43:49  Date and Time of Study: 2019-11-08 15:36:29    Adult Transthoracic Echo Complete W/ Cont if Necessary Per Protocol [[id number]] Collected:  11/09/19 1425     Updated:  11/10/19 2252     BSA 1.5 m^2       CV ECHO JOHN - RVDD 2.7 cm      IVSd 1.2 cm      IVSs 2.0 cm      LVIDd 4.6 cm      LVIDs 2.5 cm      LVPWd 1.0 cm      BH CV ECHO JOHN - LVPWS 1.6 cm      IVS/LVPW 1.2     FS 45.4 %      EDV(Teich) 96.1 ml      ESV(Teich) 22.2 ml      EF(Teich) 76.9 %      EDV(cubed) 95.8 ml      ESV(cubed) 15.6 ml      EF(cubed) 83.8 %      % IVS thick 63.7 %      % LVPW thick 55.6 %      LV mass(C)d 190.0 grams      LV mass(C)dI 128.8 grams/m^2      LV mass(C)s 181.9 grams      LV mass(C)sI 123.3 grams/m^2      SV(Teich) 73.9 ml      SI(Teich) 50.1 ml/m^2      SV(cubed) 80.2 ml      SI(cubed) 54.4 ml/m^2      Ao root diam 3.1 cm      Ao root area 7.7 cm^2      ACS 1.8 cm      LVOT  diam 1.8 cm      LVOT area 2.6 cm^2      RVOT diam 1.7 cm      RVOT area 2.3 cm^2      EDV(MOD-sp4) 43.1 ml      ESV(MOD-sp4) 14.4 ml      EF(MOD-sp4) 66.5 %      EDV(MOD-sp2) 50.4 ml      ESV(MOD-sp2) 12.1 ml      EF(MOD-sp2) 76.0 %      SV(MOD-sp4) 28.7 ml      SI(MOD-sp4) 19.4 ml/m^2      SV(MOD-sp2) 38.3 ml      SI(MOD-sp2) 26.0 ml/m^2      Ao root area (BSA corrected) 2.1     LV Adames Vol (BSA corrected) 29.2 ml/m^2      LV Sys Vol (BSA corrected) 9.8 ml/m^2      MV E max priscilla 64.3 cm/sec      MV A max priscilla 123.6 cm/sec      MV E/A 0.52     MV V2 max 136.8 cm/sec      MV max PG 7.5 mmHg      MV V2 mean 78.8 cm/sec      MV mean PG 3.0 mmHg      MV V2 VTI 40.3 cm      MVA(VTI) 1.9 cm^2      MV dec slope 558.1 cm/sec^2      MV dec time 0.23 sec      Ao pk priscilla 195.4 cm/sec      Ao max PG 15.4 mmHg      Ao max PG (full) 5.7 mmHg      Ao V2 mean 126.4 cm/sec      Ao mean PG 7.4 mmHg      Ao mean PG (full) 2.4 mmHg      Ao V2 VTI 40.1 cm      DYANA(I,A) 1.9 cm^2      DYANA(I,D) 1.9 cm^2      DYANA(V,A) 2.1 cm^2      DYANA(V,D) 2.1 cm^2      LV V1 max PG 9.7 mmHg      LV V1 mean PG 4.9 mmHg      LV V1 max 155.7 cm/sec      LV V1 mean 102.5 cm/sec      LV V1 VTI 29.1 cm      SV(Ao) 308.1 ml      SI(Ao) 208.8 ml/m^2      SV(LVOT) 77.0 ml      SV(RVOT) 43.4 ml      SI(LVOT) 52.2 ml/m^2      PA V2 max 125.1 cm/sec      PA max PG 6.3 mmHg      PA max PG (full) 0.55 mmHg      PA V2 mean 82.7 cm/sec      PA mean PG 3.2 mmHg      PA mean PG (full) 0.46 mmHg      PA V2 VTI 21.5 cm      PVA(I,A) 2.0 cm^2       CV ECHO JOHN - PVA(I,D) 2.0 cm^2       CV ECHO JOHN - PVA(V,A) 2.2 cm^2       CV ECHO JOHN - PVA(V,D) 2.2 cm^2      PA acc time 0.09 sec      RV V1 max PG 5.7 mmHg      RV V1 mean PG 2.7 mmHg      RV V1 max 119.6 cm/sec      RV V1 mean 72.3 cm/sec      RV V1 VTI 18.8 cm      TR max priscilla 279.9 cm/sec      RVSP(TR) 34.3 mmHg      RAP systole 3.0 mmHg      PA pr(Accel) 40.1 mmHg      Qp/Qs 0.56      CV ECHO JOHN - BZI_BMI 22.5  kilograms/m^2       CV ECHO JOHN - BSA([place]) 1.5 m^2       CV ECHO JOHN - BZI_METRIC_WEIGHT 52.2 kg       CV ECHO JOHN - BZI_METRIC_HEIGHT 152.4 cm      Target HR (85%) 113 bpm      Max. Pred. HR (100%) 133 bpm      EF(MOD-bp) 70.0 %      LA dimension(2D) 3.9 cm     Narrative:       · Left ventricular systolic function is normal.  · There is calcification of the aortic valve.  · Mild mitral valve regurgitation is present  · Mild tricuspid valve regurgitation is present.  · LV ejection fraction is about 65 to 70%  · No pericardial effusion noted             Cardiac Studies:  Echo-   Results for orders placed during the hospital encounter of 11/08/19   Adult Transthoracic Echo Complete W/ Cont if Necessary Per Protocol    Narrative · Left ventricular systolic function is normal.  · There is calcification of the aortic valve.  · Mild mitral valve regurgitation is present  · Mild tricuspid valve regurgitation is present.  · LV ejection fraction is about 65 to 70%  · No pericardial effusion noted        Stress Myoview-  Cath-      Medication Review:   Scheduled Meds:  Continuous Infusions:  No current facility-administered medications for this encounter.   PRN Meds:.      Assessment/Plan   Patient Active Problem List   Diagnosis   • Weakness   • Acute exacerbation of chronic obstructive pulmonary disease (COPD) (CMS/HCC)   • Common peroneal neuropathy   • Degeneration of lumbar or lumbosacral intervertebral disc   • Lumbar radiculopathy   • Spinal stenosis of lumbar region   • Intracranial atherosclerosis   • Acute ischemic right MCA stroke (CMS/HCC)   • Focal seizure (CMS/HCC)     Clinically patient is stable.  Hemodynamics are within desirable range.  At this stage continue observation.  Continue current treatment.  Patient can be discharged and follow with me as an outpatient    Grayson Herzog MD  11/13/19  9:57 PM

## 2019-11-16 ENCOUNTER — LAB REQUISITION (OUTPATIENT)
Dept: LAB | Facility: HOSPITAL | Age: 84
End: 2019-11-16

## 2019-11-16 DIAGNOSIS — Z00.00 ENCOUNTER FOR GENERAL ADULT MEDICAL EXAMINATION WITHOUT ABNORMAL FINDINGS: ICD-10-CM

## 2019-11-16 LAB
ANION GAP SERPL CALCULATED.3IONS-SCNC: 12 MMOL/L (ref 5–15)
BUN BLD-MCNC: 14 MG/DL (ref 8–23)
BUN/CREAT SERPL: 24.6 (ref 7–25)
CALCIUM SPEC-SCNC: 8.5 MG/DL (ref 8.6–10.5)
CHLORIDE SERPL-SCNC: 99 MMOL/L (ref 98–107)
CO2 SERPL-SCNC: 22 MMOL/L (ref 22–29)
CREAT BLD-MCNC: 0.57 MG/DL (ref 0.57–1)
DEPRECATED RDW RBC AUTO: 49.4 FL (ref 37–54)
ERYTHROCYTE [DISTWIDTH] IN BLOOD BY AUTOMATED COUNT: 13.6 % (ref 12.3–15.4)
GFR SERPL CREATININE-BSD FRML MDRD: 100 ML/MIN/1.73
GLUCOSE BLD-MCNC: 91 MG/DL (ref 65–99)
HCT VFR BLD AUTO: 31.3 % (ref 34–46.6)
HGB BLD-MCNC: 10.5 G/DL (ref 12–15.9)
MCH RBC QN AUTO: 34.4 PG (ref 26.6–33)
MCHC RBC AUTO-ENTMCNC: 33.5 G/DL (ref 31.5–35.7)
MCV RBC AUTO: 102.8 FL (ref 79–97)
PLATELET # BLD AUTO: 296 10*3/MM3 (ref 140–450)
PMV BLD AUTO: 8.3 FL (ref 6–12)
POTASSIUM BLD-SCNC: 4.2 MMOL/L (ref 3.5–5.2)
RBC # BLD AUTO: 3.04 10*6/MM3 (ref 3.77–5.28)
SODIUM BLD-SCNC: 133 MMOL/L (ref 136–145)
WBC NRBC COR # BLD: 4 10*3/MM3 (ref 3.4–10.8)

## 2019-11-16 PROCEDURE — 80048 BASIC METABOLIC PNL TOTAL CA: CPT

## 2019-11-16 PROCEDURE — 85027 COMPLETE CBC AUTOMATED: CPT

## 2019-11-22 ENCOUNTER — LAB REQUISITION (OUTPATIENT)
Dept: LAB | Facility: HOSPITAL | Age: 84
End: 2019-11-22

## 2019-11-22 DIAGNOSIS — R53.1 WEAKNESS: ICD-10-CM

## 2019-11-22 LAB
DEPRECATED RDW RBC AUTO: 47.3 FL (ref 37–54)
ERYTHROCYTE [DISTWIDTH] IN BLOOD BY AUTOMATED COUNT: 13.5 % (ref 12.3–15.4)
HCT VFR BLD AUTO: 30.6 % (ref 34–46.6)
HGB BLD-MCNC: 10.9 G/DL (ref 12–15.9)
MCH RBC QN AUTO: 36.2 PG (ref 26.6–33)
MCHC RBC AUTO-ENTMCNC: 35.5 G/DL (ref 31.5–35.7)
MCV RBC AUTO: 101.8 FL (ref 79–97)
PLATELET # BLD AUTO: 325 10*3/MM3 (ref 140–450)
PMV BLD AUTO: 7.8 FL (ref 6–12)
RBC # BLD AUTO: 3.01 10*6/MM3 (ref 3.77–5.28)
WBC NRBC COR # BLD: 4 10*3/MM3 (ref 3.4–10.8)

## 2019-11-22 PROCEDURE — 85027 COMPLETE CBC AUTOMATED: CPT

## 2021-06-21 ENCOUNTER — TRANSCRIBE ORDERS (OUTPATIENT)
Dept: ADMINISTRATIVE | Facility: HOSPITAL | Age: 86
End: 2021-06-21

## 2021-06-21 DIAGNOSIS — Z12.31 BREAST CANCER SCREENING BY MAMMOGRAM: Primary | ICD-10-CM

## 2023-07-25 ENCOUNTER — APPOINTMENT (OUTPATIENT)
Dept: CT IMAGING | Facility: HOSPITAL | Age: 88
DRG: 091 | End: 2023-07-25
Payer: MEDICARE

## 2023-07-25 ENCOUNTER — HOSPITAL ENCOUNTER (INPATIENT)
Facility: HOSPITAL | Age: 88
LOS: 2 days | Discharge: NURSING, REHAB OR LONG-TERM CARE | DRG: 091 | End: 2023-07-27
Attending: INTERNAL MEDICINE | Admitting: INTERNAL MEDICINE
Payer: MEDICARE

## 2023-07-25 ENCOUNTER — APPOINTMENT (OUTPATIENT)
Dept: GENERAL RADIOLOGY | Facility: HOSPITAL | Age: 88
DRG: 091 | End: 2023-07-25
Payer: MEDICARE

## 2023-07-25 PROBLEM — R29.6 FREQUENT FALLS: Status: ACTIVE | Noted: 2023-07-25

## 2023-07-25 LAB
ALBUMIN SERPL-MCNC: 4.3 G/DL (ref 3.5–5.2)
ALBUMIN/GLOB SERPL: 1.7 G/DL
ALP SERPL-CCNC: 110 U/L (ref 39–117)
ALT SERPL W P-5'-P-CCNC: 17 U/L (ref 1–33)
ANION GAP SERPL CALCULATED.3IONS-SCNC: 10 MMOL/L (ref 5–15)
AST SERPL-CCNC: 25 U/L (ref 1–32)
BACTERIA UR QL AUTO: ABNORMAL /HPF
BILIRUB SERPL-MCNC: 0.2 MG/DL (ref 0–1.2)
BILIRUB UR QL STRIP: NEGATIVE
BUN SERPL-MCNC: 17 MG/DL (ref 8–23)
BUN/CREAT SERPL: 27.9 (ref 7–25)
CALCIUM SPEC-SCNC: 8.9 MG/DL (ref 8.2–9.6)
CHLORIDE SERPL-SCNC: 102 MMOL/L (ref 98–107)
CLARITY UR: CLEAR
CO2 SERPL-SCNC: 30 MMOL/L (ref 22–29)
COLOR UR: ABNORMAL
CREAT SERPL-MCNC: 0.61 MG/DL (ref 0.57–1)
EGFRCR SERPLBLD CKD-EPI 2021: 85.1 ML/MIN/1.73
GLOBULIN UR ELPH-MCNC: 2.5 GM/DL
GLUCOSE SERPL-MCNC: 129 MG/DL (ref 65–99)
GLUCOSE UR STRIP-MCNC: NEGATIVE MG/DL
HGB UR QL STRIP.AUTO: NEGATIVE
HYALINE CASTS UR QL AUTO: ABNORMAL /LPF
KETONES UR QL STRIP: NEGATIVE
LEUKOCYTE ESTERASE UR QL STRIP.AUTO: NEGATIVE
NITRITE UR QL STRIP: POSITIVE
NT-PROBNP SERPL-MCNC: 271.1 PG/ML (ref 0–1800)
PH UR STRIP.AUTO: 7 [PH] (ref 5–8)
POTASSIUM SERPL-SCNC: 4.5 MMOL/L (ref 3.5–5.2)
PROT SERPL-MCNC: 6.8 G/DL (ref 6–8.5)
PROT UR QL STRIP: NEGATIVE
RBC # UR STRIP: ABNORMAL /HPF
REF LAB TEST METHOD: ABNORMAL
SODIUM SERPL-SCNC: 142 MMOL/L (ref 136–145)
SP GR UR STRIP: 1.02 (ref 1–1.03)
SQUAMOUS #/AREA URNS HPF: ABNORMAL /HPF
T4 FREE SERPL-MCNC: 0.82 NG/DL (ref 0.93–1.7)
TSH SERPL DL<=0.05 MIU/L-ACNC: 0.46 UIU/ML (ref 0.27–4.2)
UROBILINOGEN UR QL STRIP: ABNORMAL
WBC # UR STRIP: ABNORMAL /HPF

## 2023-07-25 PROCEDURE — 83880 ASSAY OF NATRIURETIC PEPTIDE: CPT | Performed by: INTERNAL MEDICINE

## 2023-07-25 PROCEDURE — 80053 COMPREHEN METABOLIC PANEL: CPT | Performed by: INTERNAL MEDICINE

## 2023-07-25 PROCEDURE — 70450 CT HEAD/BRAIN W/O DYE: CPT

## 2023-07-25 PROCEDURE — 93005 ELECTROCARDIOGRAM TRACING: CPT | Performed by: INTERNAL MEDICINE

## 2023-07-25 PROCEDURE — 84439 ASSAY OF FREE THYROXINE: CPT | Performed by: INTERNAL MEDICINE

## 2023-07-25 PROCEDURE — 84443 ASSAY THYROID STIM HORMONE: CPT | Performed by: INTERNAL MEDICINE

## 2023-07-25 PROCEDURE — 81001 URINALYSIS AUTO W/SCOPE: CPT | Performed by: INTERNAL MEDICINE

## 2023-07-25 PROCEDURE — 93010 ELECTROCARDIOGRAM REPORT: CPT | Performed by: INTERNAL MEDICINE

## 2023-07-25 PROCEDURE — 71045 X-RAY EXAM CHEST 1 VIEW: CPT

## 2023-07-25 RX ORDER — CARBAMAZEPINE 200 MG/1
200 TABLET ORAL 4 TIMES DAILY
Status: DISCONTINUED | OUTPATIENT
Start: 2023-07-25 | End: 2023-07-27 | Stop reason: HOSPADM

## 2023-07-25 RX ORDER — SODIUM CHLORIDE 0.9 % (FLUSH) 0.9 %
10 SYRINGE (ML) INJECTION AS NEEDED
Status: DISCONTINUED | OUTPATIENT
Start: 2023-07-25 | End: 2023-07-27 | Stop reason: HOSPADM

## 2023-07-25 RX ORDER — POTASSIUM CHLORIDE 20 MEQ/1
20 TABLET, EXTENDED RELEASE ORAL DAILY
Status: DISCONTINUED | OUTPATIENT
Start: 2023-07-25 | End: 2023-07-27 | Stop reason: HOSPADM

## 2023-07-25 RX ORDER — PANTOPRAZOLE SODIUM 40 MG/1
40 TABLET, DELAYED RELEASE ORAL
Status: DISCONTINUED | OUTPATIENT
Start: 2023-07-26 | End: 2023-07-27 | Stop reason: HOSPADM

## 2023-07-25 RX ORDER — BISACODYL 5 MG/1
5 TABLET, DELAYED RELEASE ORAL DAILY PRN
Status: DISCONTINUED | OUTPATIENT
Start: 2023-07-25 | End: 2023-07-27 | Stop reason: HOSPADM

## 2023-07-25 RX ORDER — ACETAMINOPHEN 325 MG/1
650 TABLET ORAL EVERY 4 HOURS PRN
Status: DISCONTINUED | OUTPATIENT
Start: 2023-07-25 | End: 2023-07-27 | Stop reason: HOSPADM

## 2023-07-25 RX ORDER — SODIUM CHLORIDE 0.9 % (FLUSH) 0.9 %
10 SYRINGE (ML) INJECTION EVERY 12 HOURS SCHEDULED
Status: DISCONTINUED | OUTPATIENT
Start: 2023-07-25 | End: 2023-07-27 | Stop reason: HOSPADM

## 2023-07-25 RX ORDER — GABAPENTIN 100 MG/1
100 CAPSULE ORAL NIGHTLY
Status: DISCONTINUED | OUTPATIENT
Start: 2023-07-25 | End: 2023-07-26

## 2023-07-25 RX ORDER — HYDRALAZINE HYDROCHLORIDE 25 MG/1
100 TABLET, FILM COATED ORAL EVERY 12 HOURS SCHEDULED
Status: DISCONTINUED | OUTPATIENT
Start: 2023-07-25 | End: 2023-07-27 | Stop reason: HOSPADM

## 2023-07-25 RX ORDER — LANOLIN ALCOHOL/MO/W.PET/CERES
1000 CREAM (GRAM) TOPICAL DAILY
Status: DISCONTINUED | OUTPATIENT
Start: 2023-07-25 | End: 2023-07-27 | Stop reason: HOSPADM

## 2023-07-25 RX ORDER — SODIUM CHLORIDE 9 MG/ML
40 INJECTION, SOLUTION INTRAVENOUS AS NEEDED
Status: DISCONTINUED | OUTPATIENT
Start: 2023-07-25 | End: 2023-07-27 | Stop reason: HOSPADM

## 2023-07-25 RX ORDER — AMOXICILLIN 250 MG
2 CAPSULE ORAL 2 TIMES DAILY
Status: DISCONTINUED | OUTPATIENT
Start: 2023-07-25 | End: 2023-07-27 | Stop reason: HOSPADM

## 2023-07-25 RX ORDER — ASPIRIN 81 MG/1
81 TABLET, CHEWABLE ORAL DAILY
Status: DISCONTINUED | OUTPATIENT
Start: 2023-07-26 | End: 2023-07-26

## 2023-07-25 RX ORDER — POLYETHYLENE GLYCOL 3350 17 G/17G
17 POWDER, FOR SOLUTION ORAL DAILY PRN
Status: DISCONTINUED | OUTPATIENT
Start: 2023-07-25 | End: 2023-07-27 | Stop reason: HOSPADM

## 2023-07-25 RX ORDER — ONDANSETRON 2 MG/ML
4 INJECTION INTRAMUSCULAR; INTRAVENOUS EVERY 6 HOURS PRN
Status: DISCONTINUED | OUTPATIENT
Start: 2023-07-25 | End: 2023-07-27 | Stop reason: HOSPADM

## 2023-07-25 RX ORDER — ATORVASTATIN CALCIUM 20 MG/1
20 TABLET, FILM COATED ORAL NIGHTLY
Status: DISCONTINUED | OUTPATIENT
Start: 2023-07-25 | End: 2023-07-26

## 2023-07-25 RX ORDER — NITROGLYCERIN 0.4 MG/1
0.4 TABLET SUBLINGUAL
Status: DISCONTINUED | OUTPATIENT
Start: 2023-07-25 | End: 2023-07-27 | Stop reason: HOSPADM

## 2023-07-25 RX ORDER — BISACODYL 10 MG
10 SUPPOSITORY, RECTAL RECTAL DAILY PRN
Status: DISCONTINUED | OUTPATIENT
Start: 2023-07-25 | End: 2023-07-27 | Stop reason: HOSPADM

## 2023-07-25 RX ORDER — AMLODIPINE BESYLATE 5 MG/1
10 TABLET ORAL
Status: DISCONTINUED | OUTPATIENT
Start: 2023-07-26 | End: 2023-07-27 | Stop reason: HOSPADM

## 2023-07-25 RX ADMIN — Medication 10 ML: at 20:10

## 2023-07-25 RX ADMIN — GABAPENTIN 100 MG: 100 CAPSULE ORAL at 21:06

## 2023-07-25 RX ADMIN — CARBAMAZEPINE 200 MG: 200 TABLET ORAL at 20:07

## 2023-07-25 RX ADMIN — CYANOCOBALAMIN TAB 1000 MCG 1000 MCG: 1000 TAB at 18:25

## 2023-07-25 RX ADMIN — HYDRALAZINE HYDROCHLORIDE 100 MG: 25 TABLET, FILM COATED ORAL at 20:07

## 2023-07-25 RX ADMIN — ATORVASTATIN CALCIUM 20 MG: 20 TABLET, FILM COATED ORAL at 21:06

## 2023-07-25 RX ADMIN — POTASSIUM CHLORIDE 20 MEQ: 1500 TABLET, EXTENDED RELEASE ORAL at 18:25

## 2023-07-25 NOTE — PLAN OF CARE
Goal Outcome Evaluation:  Patient was a direct admit. VSS. Resting in bed with no complaints. Daughters at bedside.

## 2023-07-25 NOTE — H&P
"Patient Care Team:  Cyndy Rain PA as PCP - General (Physician Assistant)    Chief complaint frequent falls     Subjective     Patient is a 89 y.o. female with COPD, HTN, trigeminal neuralgia and history of TIA presents with frequent falls. She most recently fell on Sunday. Her daughter witnessed the fall. Daughter and patient state that her \"legs buckled\" while ambulating and she fell. She states she did not hit her head. She denies acute injury. Denies jerking motions. The patient lives alone. She uses a walker to ambulate. She has had history of falls, but it is becoming increasingly more frequent. Her daughter states she has fallen 7 times in the past month. Daughter states the patient does not lose consciousness when she falls. Daughter states she did not witness any jerking or seizure like motions.     The daughter states her mom will have \"episodes of slowed/slurred speech and a confused look on her face\". These episodes usually last an hour and resolve with rest. Daughter states the patient was evaluated at the hospital 4 years ago for similar episodes, and she was diagnosed with TIAs. Daughter also states that her mom's hand coordination has been \"off.\" Patient will go to grab an object and not be able to grab it. She also seems \"a lot weaker\" over the past month than she has been. She has increased difficulty walking or doing activity due to generalized weakness.   Patient sees a neurologist for trigeminal neuralgia, for which she takes carbamazepine.      Daughters are concerned about increase in falls and their mom's weakness. Would like to be evaluated for skilled rehabilitation.  She has chronic right shoulder pain that is unchanged after her recent fall.    Review of Systems   Constitutional:  Positive for fatigue. Negative for fever.   Eyes:  Negative for visual disturbance.   Respiratory:  Negative for shortness of breath.    Cardiovascular:  Negative for chest pain and palpitations. "   Genitourinary:  Negative for difficulty urinating and dysuria.        Dark urine     Musculoskeletal:  Positive for arthralgias and neck pain.   Neurological:  Positive for speech difficulty, weakness and light-headedness. Negative for syncope.   Psychiatric/Behavioral:  Positive for confusion.         History  Past Medical History:   Diagnosis Date    Acute exacerbation of chronic obstructive pulmonary disease (COPD) 2/1/2013    Acute ischemic right MCA stroke 11/12/2019    Arthritis     Elevated cholesterol     GERD (gastroesophageal reflux disease)      Past Surgical History:   Procedure Laterality Date    BACK SURGERY      DENTAL PROCEDURE      HYSTERECTOMY       Family History   Problem Relation Age of Onset    Heart disease Father      Social History     Tobacco Use    Smoking status: Never    Smokeless tobacco: Never   Vaping Use    Vaping Use: Never used   Substance Use Topics    Alcohol use: No    Drug use: No     Medications Prior to Admission   Medication Sig Dispense Refill Last Dose    amLODIPine (NORVASC) 10 MG tablet    7/25/2023    aspirin 81 MG chewable tablet Chew 1 tablet Daily. 30 tablet 2 7/25/2023    atorvastatin (LIPITOR) 20 MG tablet 1 tablet Every Night.   7/24/2023    carBAMazepine (TEGretol) 200 MG tablet Take 1 tablet by mouth 4 (Four) Times a Day.   7/25/2023    Combigan 0.2-0.5 % ophthalmic solution Every Night.   7/24/2023    gabapentin (NEURONTIN) 100 MG capsule Take 1 capsule by mouth 3 (Three) Times a Day. (Patient taking differently: Take 1 capsule by mouth 1 (One) Time. With dinner) 90 capsule 3 7/24/2023    hydrALAZINE (APRESOLINE) 100 MG tablet Take 1 tablet by mouth 2 (Two) Times a Day.   7/25/2023    KLOR-CON 20 MEQ CR tablet Daily. Once a day with dinner   7/24/2023    omeprazole (priLOSEC) 20 MG capsule Take 1 capsule by mouth Daily.   7/25/2023    vitamin D3 125 MCG (5000 UT) capsule capsule Take 1 capsule by mouth Daily.   7/25/2023    amLODIPine (NORVASC) 5 MG tablet  Take 1 tablet by mouth Daily. 30 tablet 3     cephalexin (KEFLEX) 500 MG capsule Take 1 capsule by mouth 3 (Three) Times a Day. 21 capsule 0 Unknown    vitamin B-12 (VITAMIN B-12) 1000 MCG tablet Take 1 tablet by mouth Daily. 30 tablet 3 Unknown    zonisamide (ZONEGRAN) 100 MG capsule Take 2 capsules by mouth 2 (Two) Times a Day. 120 capsule 1 Unknown     Allergies:  Banana and Green beans    Objective     Vital Signs  Heart Rate:  [67] 67  Resp:  [18] 18  BP: (142)/(59) 142/59     Physical Exam:      General Appearance:    Frail, Alert, cooperative, in no acute distress, oriented x 2   Head:    Normocephalic, without obvious abnormality, atraumatic   Eyes:            Lids and lashes normal, conjunctivae and sclerae normal, no   icterus, no pallor, corneas clear, PERRLA   Ears:    Ears appear intact with no abnormalities noted   Throat:   No oral lesions, no thrush, oral mucosa moist   Neck:   No adenopathy, supple, trachea midline, no thyromegaly, no   carotid bruit, no JVD   Lungs:     Clear to auscultation,respirations regular, even and                  unlabored    Heart:    Regular rhythm and normal rate, normal S1 and S2, no            murmur, no gallop, no rub, no click   Chest Wall:    No abnormalities observed   Abdomen:     Normal bowel sounds, no masses, no organomegaly, soft        non-tender, non-distended, no guarding, no rebound                tenderness   Extremities:   Moves all extremities well, no edema, no cyanosis, no             redness   Pulses:   Pulses palpable and equal bilaterally   Skin:   No bleeding, bruising or rash   Lymph nodes:   No palpable adenopathy   Neurologic:   Cranial nerves 2 - 12 grossly intact, sensation intact, DTR       present and equal bilaterally; gait not assessed       Results Review:     Imaging Results (Last 24 Hours)       Procedure Component Value Units Date/Time    XR Chest 1 View [[id number]] Collected: 07/25/23 1623     Updated: 07/25/23 1626    Narrative:       XR CHEST 1 VW    Date of Exam: 7/25/2023 4:14 PM EDT    Indication: shortness of breath    Comparison: 11/8/2019    Findings:  Lung volumes are relatively low. Patient is rotated to the left, which partially obscures the left lateral costophrenic angle. Heart shadow appears borderline enlarged in the vasculature is cephalized. Pattern is similar to 11/8/2019 exam. Patchy disease   previously seen in the right and left midlung is no longer identified. No clearly new pulmonary parenchymal disease is appreciated. Incidental note is again made of dense mitral annular calcification.          Impression:      Impression:  Cardiomegaly and mild pulmonary venous hypertension. No evidence of overt congestive failure.      Electronically Signed: Patrick Puckett    7/25/2023 4:24 PM EDT    Workstation ID: [id number]             Lab Results (last 24 hours)       Procedure Component Value Units Date/Time    T4, Free [[id number]] Collected: 07/25/23 1548    Specimen: Blood Updated: 07/25/23 1658    TSH [[id number]]  (Normal) Collected: 07/25/23 1548    Specimen: Blood Updated: 07/25/23 1631     TSH 0.460 uIU/mL     BNP [[id number]]  (Normal) Collected: 07/25/23 1548    Specimen: Blood Updated: 07/25/23 1631     proBNP 271.1 pg/mL     Narrative:      Among patients with dyspnea, NT-proBNP is highly sensitive for the detection of acute congestive heart failure. In addition NT-proBNP of <300 pg/ml effectively rules out acute congestive heart failure with 99% negative predictive value.    Results may be falsely decreased if patient taking Biotin.      Comprehensive Metabolic Panel [[id number]]  (Abnormal) Collected: 07/25/23 1548    Specimen: Blood Updated: 07/25/23 1626     Glucose 129 mg/dL      BUN 17 mg/dL      Creatinine 0.61 mg/dL      Sodium 142 mmol/L      Potassium 4.5 mmol/L      Chloride 102 mmol/L      CO2 30.0 mmol/L      Calcium 8.9 mg/dL      Total Protein 6.8 g/dL      Albumin 4.3 g/dL      ALT (SGPT) 17 U/L      AST (SGOT) 25  U/L      Alkaline Phosphatase 110 U/L      Total Bilirubin 0.2 mg/dL      Globulin 2.5 gm/dL      A/G Ratio 1.7 g/dL      BUN/Creatinine Ratio 27.9     Anion Gap 10.0 mmol/L      eGFR 85.1 mL/min/1.73     Narrative:      GFR Normal >60  Chronic Kidney Disease <60  Kidney Failure <15    The GFR formula is only valid for adults with stable renal function between ages 18 and 70.             I reviewed the patient's new clinical results.    Assessment & Plan       Frequent falls    Frequent falls-  Description of episodes of confusion, blank stare, slurred speech, and weakness concerning for TIA vs seizure vs near-syncope vs generalized weakness from UTI or other source.  Checking TSH, b12, echo, EKG, basic labs, head ct, UA.  Consulting neurology.  Consulted PT and OT.  HTN  - hydralazine, amlodipine   Hyperlipidemia- atorvastatin   4.  Trigeminal neuralgia - continue gabapentin, Tegretol.  Holding muscle relaxer.    GI profy- PPI   DVT profy- SCD's    I discussed the patients findings and my recommendations with patient.     Shanon Thakkar, PA-S   Rhonda Grissom MD

## 2023-07-26 ENCOUNTER — APPOINTMENT (OUTPATIENT)
Dept: CARDIOLOGY | Facility: HOSPITAL | Age: 88
DRG: 091 | End: 2023-07-26
Payer: MEDICARE

## 2023-07-26 LAB
BASOPHILS # BLD AUTO: 0 10*3/MM3 (ref 0–0.2)
BASOPHILS NFR BLD AUTO: 0.8 % (ref 0–1.5)
BH CV ECHO MEAS - ACS: 1.5 CM
BH CV ECHO MEAS - AO MAX PG: 14.4 MMHG
BH CV ECHO MEAS - AO MEAN PG: 8 MMHG
BH CV ECHO MEAS - AO V2 MAX: 190 CM/SEC
BH CV ECHO MEAS - AO V2 VTI: 43.4 CM
BH CV ECHO MEAS - AVA(I,D): 1.95 CM2
BH CV ECHO MEAS - EDV(CUBED): 85.2 ML
BH CV ECHO MEAS - ESV(CUBED): 32.8 ML
BH CV ECHO MEAS - FS: 27.3 %
BH CV ECHO MEAS - IVS/LVPW: 1.27 CM
BH CV ECHO MEAS - IVSD: 1.4 CM
BH CV ECHO MEAS - LA DIMENSION: 3.3 CM
BH CV ECHO MEAS - LAT PEAK E' VEL: 7.4 CM/SEC
BH CV ECHO MEAS - LV MASS(C)D: 203 GRAMS
BH CV ECHO MEAS - LV MAX PG: 8.8 MMHG
BH CV ECHO MEAS - LV MEAN PG: 5 MMHG
BH CV ECHO MEAS - LV V1 MAX: 148 CM/SEC
BH CV ECHO MEAS - LV V1 VTI: 33.2 CM
BH CV ECHO MEAS - LVIDD: 4.4 CM
BH CV ECHO MEAS - LVIDS: 3.2 CM
BH CV ECHO MEAS - LVOT AREA: 2.5 CM2
BH CV ECHO MEAS - LVOT DIAM: 1.8 CM
BH CV ECHO MEAS - LVPWD: 1.1 CM
BH CV ECHO MEAS - MED PEAK E' VEL: 7.9 CM/SEC
BH CV ECHO MEAS - MR MAX PG: 106.1 MMHG
BH CV ECHO MEAS - MR MAX VEL: 515 CM/SEC
BH CV ECHO MEAS - MV A MAX VEL: 142 CM/SEC
BH CV ECHO MEAS - MV DEC TIME: 0.36 MSEC
BH CV ECHO MEAS - MV E MAX VEL: 71.8 CM/SEC
BH CV ECHO MEAS - MV E/A: 0.51
BH CV ECHO MEAS - PA V2 MAX: 148.5 CM/SEC
BH CV ECHO MEAS - QP/QS: 0.38
BH CV ECHO MEAS - RV MAX PG: 4 MMHG
BH CV ECHO MEAS - RV V1 MAX: 99.8 CM/SEC
BH CV ECHO MEAS - RV V1 VTI: 21 CM
BH CV ECHO MEAS - RVDD: 2.4 CM
BH CV ECHO MEAS - RVOT DIAM: 1.4 CM
BH CV ECHO MEAS - SV(LVOT): 84.5 ML
BH CV ECHO MEAS - SV(RVOT): 32.3 ML
BH CV ECHO MEAS - TAPSE (>1.6): 3.7 CM
BH CV ECHO MEAS - TR MAX PG: 28.5 MMHG
BH CV ECHO MEAS - TR MAX VEL: 267 CM/SEC
BH CV ECHO MEASUREMENTS AVERAGE E/E' RATIO: 9.39
BH CV XLRA - TDI S': 20.6 CM/SEC
DEPRECATED RDW RBC AUTO: 49 FL (ref 37–54)
EOSINOPHIL # BLD AUTO: 0.2 10*3/MM3 (ref 0–0.4)
EOSINOPHIL NFR BLD AUTO: 4.2 % (ref 0.3–6.2)
ERYTHROCYTE [DISTWIDTH] IN BLOOD BY AUTOMATED COUNT: 13.3 % (ref 12.3–15.4)
HCT VFR BLD AUTO: 36.5 % (ref 34–46.6)
HGB BLD-MCNC: 11.9 G/DL (ref 12–15.9)
LYMPHOCYTES # BLD AUTO: 1.3 10*3/MM3 (ref 0.7–3.1)
LYMPHOCYTES NFR BLD AUTO: 24.6 % (ref 19.6–45.3)
MCH RBC QN AUTO: 33 PG (ref 26.6–33)
MCHC RBC AUTO-ENTMCNC: 32.6 G/DL (ref 31.5–35.7)
MCV RBC AUTO: 101.4 FL (ref 79–97)
MONOCYTES # BLD AUTO: 0.6 10*3/MM3 (ref 0.1–0.9)
MONOCYTES NFR BLD AUTO: 11.1 % (ref 5–12)
NEUTROPHILS NFR BLD AUTO: 3 10*3/MM3 (ref 1.7–7)
NEUTROPHILS NFR BLD AUTO: 59.3 % (ref 42.7–76)
NRBC BLD AUTO-RTO: 0.2 /100 WBC (ref 0–0.2)
PLATELET # BLD AUTO: 188 10*3/MM3 (ref 140–450)
PMV BLD AUTO: 9 FL (ref 6–12)
QT INTERVAL: 415 MS
RBC # BLD AUTO: 3.6 10*6/MM3 (ref 3.77–5.28)
SINUS: 3.6 CM
VIT B12 BLD-MCNC: 557 PG/ML (ref 211–946)
WBC NRBC COR # BLD: 5.1 10*3/MM3 (ref 3.4–10.8)

## 2023-07-26 PROCEDURE — 97162 PT EVAL MOD COMPLEX 30 MIN: CPT

## 2023-07-26 PROCEDURE — 82607 VITAMIN B-12: CPT | Performed by: INTERNAL MEDICINE

## 2023-07-26 PROCEDURE — 93306 TTE W/DOPPLER COMPLETE: CPT

## 2023-07-26 PROCEDURE — 25010000002 CEFTRIAXONE PER 250 MG: Performed by: INTERNAL MEDICINE

## 2023-07-26 PROCEDURE — 93306 TTE W/DOPPLER COMPLETE: CPT | Performed by: INTERNAL MEDICINE

## 2023-07-26 PROCEDURE — 85025 COMPLETE CBC W/AUTO DIFF WBC: CPT | Performed by: INTERNAL MEDICINE

## 2023-07-26 PROCEDURE — 97166 OT EVAL MOD COMPLEX 45 MIN: CPT | Performed by: OCCUPATIONAL THERAPIST

## 2023-07-26 RX ORDER — GABAPENTIN 400 MG/1
400 CAPSULE ORAL
Status: DISCONTINUED | OUTPATIENT
Start: 2023-07-26 | End: 2023-07-27 | Stop reason: HOSPADM

## 2023-07-26 RX ORDER — GABAPENTIN 400 MG/1
400 CAPSULE ORAL DAILY
COMMUNITY
End: 2023-07-27 | Stop reason: HOSPADM

## 2023-07-26 RX ORDER — ATORVASTATIN CALCIUM 40 MG/1
40 TABLET, FILM COATED ORAL NIGHTLY
Status: DISCONTINUED | OUTPATIENT
Start: 2023-07-26 | End: 2023-07-27 | Stop reason: HOSPADM

## 2023-07-26 RX ADMIN — CEFTRIAXONE 1000 MG: 1 INJECTION, POWDER, FOR SOLUTION INTRAMUSCULAR; INTRAVENOUS at 20:46

## 2023-07-26 RX ADMIN — Medication 10 ML: at 11:06

## 2023-07-26 RX ADMIN — PANTOPRAZOLE SODIUM 40 MG: 40 TABLET, DELAYED RELEASE ORAL at 05:52

## 2023-07-26 RX ADMIN — ASPIRIN 81 MG: 81 TABLET, CHEWABLE ORAL at 10:59

## 2023-07-26 RX ADMIN — CARBAMAZEPINE 200 MG: 200 TABLET ORAL at 11:03

## 2023-07-26 RX ADMIN — AMLODIPINE BESYLATE 10 MG: 5 TABLET ORAL at 10:58

## 2023-07-26 RX ADMIN — POTASSIUM CHLORIDE 20 MEQ: 1500 TABLET, EXTENDED RELEASE ORAL at 17:06

## 2023-07-26 RX ADMIN — HYDRALAZINE HYDROCHLORIDE 100 MG: 25 TABLET, FILM COATED ORAL at 10:58

## 2023-07-26 RX ADMIN — CARBAMAZEPINE 200 MG: 200 TABLET ORAL at 20:43

## 2023-07-26 RX ADMIN — ATORVASTATIN CALCIUM 40 MG: 40 TABLET, FILM COATED ORAL at 20:43

## 2023-07-26 RX ADMIN — HYDRALAZINE HYDROCHLORIDE 100 MG: 25 TABLET, FILM COATED ORAL at 20:45

## 2023-07-26 RX ADMIN — Medication 10 ML: at 20:47

## 2023-07-26 RX ADMIN — CARBAMAZEPINE 200 MG: 200 TABLET ORAL at 17:06

## 2023-07-26 RX ADMIN — GABAPENTIN 400 MG: 400 CAPSULE ORAL at 17:06

## 2023-07-26 RX ADMIN — SENNOSIDES AND DOCUSATE SODIUM 2 TABLET: 50; 8.6 TABLET ORAL at 10:58

## 2023-07-26 NOTE — PLAN OF CARE
Goal Outcome Evaluation:  Plan of Care Reviewed With: patient, daughter           Outcome Evaluation: Pt is a 89 y.o. F adm to [facility] on 07/25/23 with reports of inc weakness, frequent falls at home (7 falls in 1 month) & episodes of slurred/slowed speech & confusion. Pt has a PMH of trigeminal neuralgia, TIAs, hyperlipidemia, left drop foot, COPD, lumbar degen, CVA & partial seizure. CT of head showed possible trace subdural hematoma. Pt also diagnosed with UTI. At baseline, pt lives at home alone in a H with 2 NICK. She uses a RW for amb. She uses a w/c in the community with family providing transportation. She is ind with dressing, toileting, feeding & sponge bathing. Her family helps her when she showers. Pt has a life alert for home use & family checks on her often. She was receiving  PT prior to adm. Upon eval, pt is A&O X2. Unable to recall month, year or situation. Pt pleasant & cooperative & able to complete bed mobility with mod ind. She req CGA for ADL transfers & min A for donning LB clothing with v.c. for orientation of clothing/shoes. She has generalized dec overall strength & activity tolerance/endurance & due to her hx of having several recent falls & confusion, she would benefit from more intensive short term inpt rehab to prepare her for returning home alone. Will continue to follow for OT tx while in acute pt care.      Anticipated Discharge Disposition (OT): inpatient rehabilitation facility

## 2023-07-26 NOTE — PROGRESS NOTES
LOS: 1 day   Patient Care Team:  Cyndy Rain PA as PCP - General (Physician Assistant)    Subjective     Interval History: stable overnight, no episodes/falls     Patient Complaints: urinary frequency     History taken from: patient  Patient states she is doing well today. She c/o increased urinary frequency.  She denies fever, chills, or lower back pain.   Patient and family denies any falls or seizure-like episodes over night. CM working with patient to find skilled rehab placement.     Review of Systems   Constitutional:  Positive for activity change. Negative for chills, diaphoresis and fever.   Respiratory:  Negative for chest tightness and shortness of breath.    Cardiovascular:  Negative for chest pain.   Gastrointestinal:  Negative for abdominal pain, diarrhea, nausea and vomiting.   Genitourinary:  Positive for dysuria, frequency and urgency. Negative for flank pain.   Musculoskeletal:  Positive for gait problem.   Neurological:  Positive for seizures and weakness. Negative for syncope.   Psychiatric/Behavioral:  Positive for confusion.          Objective     Vital Signs  Temp:  [96.5 °F (35.8 °C)-98.9 °F (37.2 °C)] (P) 96.5 °F (35.8 °C)  Heart Rate:  [56-67] 66  Resp:  [13-20] 18  BP: (142-168)/(57-77) 168/77    Physical Exam:     General Appearance:    Frail, pleasant, A&O x2    Head:    Normocephalic, without obvious abnormality, atraumatic   Eyes:            Lids and lashes normal, conjunctivae and sclerae normal, no   icterus, no pallor, corneas clear, PERRLA   Ears:    Ears appear intact with no abnormalities noted   Throat:   No oral lesions, no thrush, oral mucosa moist   Neck:   No adenopathy, supple, trachea midline, no thyromegaly, no   carotid bruit, no JVD   Lungs:     Clear to auscultation,respirations regular, even and                  unlabored    Heart:    Regular rhythm and normal rate, normal S1 and S2, no            murmur, no gallop, no rub, no click   Chest Wall:    No  abnormalities observed   Abdomen:     Normal bowel sounds, no masses, no organomegaly, soft        non-tender, non-distended, no guarding, no rebound                tenderness   Extremities:   Moves all extremities well, no edema, no cyanosis, no             redness   Pulses:   Pulses palpable and equal bilaterally   Skin:   No bleeding, bruising or rash   Lymph nodes:   No palpable adenopathy   Neurologic:   Cranial nerves 2 - 12 grossly intact, sensation intact, DTR       present and equal bilaterally        Results Review:    Lab Results (last 24 hours)       Procedure Component Value Units Date/Time    Vitamin B12 [[id number]]  (Normal) Collected: 07/26/23 0113    Specimen: Blood Updated: 07/26/23 1200     Vitamin B-12 557 pg/mL     Narrative:      Results may be falsely increased if patient taking Biotin.      CBC & Differential [[id number]]  (Abnormal) Collected: 07/26/23 0113    Specimen: Blood Updated: 07/26/23 0350    Narrative:      The following orders were created for panel order CBC & Differential.  Procedure                               Abnormality         Status                     ---------                               -----------         ------                     CBC Auto Differential[[id number]]        Abnormal            Final result                 Please view results for these tests on the individual orders.    CBC Auto Differential [[id number]]  (Abnormal) Collected: 07/26/23 0113    Specimen: Blood Updated: 07/26/23 0350     WBC 5.10 10*3/mm3      RBC 3.60 10*6/mm3      Hemoglobin 11.9 g/dL      Hematocrit 36.5 %      .4 fL      MCH 33.0 pg      MCHC 32.6 g/dL      RDW 13.3 %      RDW-SD 49.0 fl      MPV 9.0 fL      Platelets 188 10*3/mm3      Neutrophil % 59.3 %      Lymphocyte % 24.6 %      Monocyte % 11.1 %      Eosinophil % 4.2 %      Basophil % 0.8 %      Neutrophils, Absolute 3.00 10*3/mm3      Lymphocytes, Absolute 1.30 10*3/mm3      Monocytes, Absolute 0.60 10*3/mm3       Eosinophils, Absolute 0.20 10*3/mm3      Basophils, Absolute 0.00 10*3/mm3      nRBC 0.2 /100 WBC     Urinalysis, Microscopic Only - Urine, Clean Catch [[id number]]  (Abnormal) Collected: 07/25/23 2020    Specimen: Urine, Clean Catch Updated: 07/25/23 2055     RBC, UA 0-2 /HPF      WBC, UA 0-2 /HPF      Comment: Urine culture not indicated.        Bacteria, UA 4+ /HPF      Squamous Epithelial Cells, UA 0-2 /HPF      Hyaline Casts, UA None Seen /LPF      Methodology Manual Light Microscopy    Urinalysis With Culture If Indicated - Urine, Clean Catch [[id number]]  (Abnormal) Collected: 07/25/23 2020    Specimen: Urine, Clean Catch Updated: 07/25/23 2042     Color, UA Dark Yellow     Comment: Result checked          Appearance, UA Clear     pH, UA 7.0     Specific Gravity, UA 1.016     Glucose, UA Negative     Ketones, UA Negative     Bilirubin, UA Negative     Blood, UA Negative     Protein, UA Negative     Leuk Esterase, UA Negative     Nitrite, UA Positive     Urobilinogen, UA 1.0 E.U./dL    Narrative:      In absence of clinical symptoms, the presence of pyuria, bacteria, and/or nitrites on the urinalysis result does not correlate with infection.    T4, Free [[id number]]  (Abnormal) Collected: 07/25/23 1548    Specimen: Blood Updated: 07/25/23 2022     Free T4 0.82 ng/dL     Narrative:      Results may be falsely increased if patient taking Biotin.      TSH [[id number]]  (Normal) Collected: 07/25/23 1548    Specimen: Blood Updated: 07/25/23 1631     TSH 0.460 uIU/mL     BNP [[id number]]  (Normal) Collected: 07/25/23 1548    Specimen: Blood Updated: 07/25/23 1631     proBNP 271.1 pg/mL     Narrative:      Among patients with dyspnea, NT-proBNP is highly sensitive for the detection of acute congestive heart failure. In addition NT-proBNP of <300 pg/ml effectively rules out acute congestive heart failure with 99% negative predictive value.    Results may be falsely decreased if patient taking Biotin.      Comprehensive  Metabolic Panel [[id number]]  (Abnormal) Collected: 07/25/23 1548    Specimen: Blood Updated: 07/25/23 1626     Glucose 129 mg/dL      BUN 17 mg/dL      Creatinine 0.61 mg/dL      Sodium 142 mmol/L      Potassium 4.5 mmol/L      Chloride 102 mmol/L      CO2 30.0 mmol/L      Calcium 8.9 mg/dL      Total Protein 6.8 g/dL      Albumin 4.3 g/dL      ALT (SGPT) 17 U/L      AST (SGOT) 25 U/L      Alkaline Phosphatase 110 U/L      Total Bilirubin 0.2 mg/dL      Globulin 2.5 gm/dL      A/G Ratio 1.7 g/dL      BUN/Creatinine Ratio 27.9     Anion Gap 10.0 mmol/L      eGFR 85.1 mL/min/1.73     Narrative:      GFR Normal >60  Chronic Kidney Disease <60  Kidney Failure <15    The GFR formula is only valid for adults with stable renal function between ages 18 and 70.             Imaging Results (Last 24 Hours)       Procedure Component Value Units Date/Time    CT Head Without Contrast [[id number]] Collected: 07/26/23 0010     Updated: 07/26/23 0018    Narrative:      CT HEAD WO CONTRAST    Date of Exam: 7/25/2023 6:59 PM EDT    Indication: Dizziness, persistent/recurrent, cardiac or vascular cause suspected.    Comparison: 11/10/2019.    Technique: Axial CT images were obtained of the head without contrast administration.  Coronal reconstructions were performed.  Automated exposure control and iterative reconstruction methods were used.      Findings:  There is a trace amount of blood products tracking along the falx extending right of midline with the thickness of the blood products measuring up to 3.9 mm (series 2 image 38). A trace amount of blood products is seen within the cerebellum on the right   (series 2 image 11). Subtle hyperdensity present within the right cerebellum (series 2 image 11) was present on previous study from 2019 and likely represents calcifications. Additional scattered calcifications are present within the posterior fossa   which may be the sequela of previous hemorrhage.       Ventricles are normal in  size and configuration for patient's stated age.      Posterior fossa is within normal limits.    Calvarium and skull base appear intact.   Visualized sinuses show no air fluid levels. Visualized orbits are unremarkable.      Impression:      Impression:  There is a small amount of blood products seen along the high right falx likely representing trace subdural hematoma. This is new as compared to the previous study from 2019. No additional areas of hemorrhage, mass effect or midline shift. Focal punctate   hyperdensity within the right cerebellum was present in 2019 and likely represents calcifications.    The above findings were discussed with the patient's nurse Lorie at 12:12 a.m. on 7/26/2023.      Electronically Signed: Laura Oconnor    7/26/2023 12:16 AM EDT    Workstation ID: [id number]    XR Chest 1 View [[id number]] Collected: 07/25/23 1623     Updated: 07/25/23 1626    Narrative:      XR CHEST 1 VW    Date of Exam: 7/25/2023 4:14 PM EDT    Indication: shortness of breath    Comparison: 11/8/2019    Findings:  Lung volumes are relatively low. Patient is rotated to the left, which partially obscures the left lateral costophrenic angle. Heart shadow appears borderline enlarged in the vasculature is cephalized. Pattern is similar to 11/8/2019 exam. Patchy disease   previously seen in the right and left midlung is no longer identified. No clearly new pulmonary parenchymal disease is appreciated. Incidental note is again made of dense mitral annular calcification.          Impression:      Impression:  Cardiomegaly and mild pulmonary venous hypertension. No evidence of overt congestive failure.      Electronically Signed: Patrick Puckett    7/25/2023 4:24 PM EDT    Workstation ID: [id number]                 I reviewed the patient's new clinical results.    Medication Review:   Scheduled Meds:amLODIPine, 10 mg, Oral, Q24H  aspirin, 81 mg, Oral, Daily  atorvastatin, 40 mg, Oral, Nightly  carBAMazepine, 200 mg, Oral, 4x  Daily  gabapentin, 400 mg, Oral, Daily With Dinner  hydrALAZINE, 100 mg, Oral, Q12H  pantoprazole, 40 mg, Oral, Q AM  potassium chloride, 20 mEq, Oral, Daily  senna-docusate sodium, 2 tablet, Oral, BID  sodium chloride, 10 mL, Intravenous, Q12H  cyanocobalamin, 1,000 mcg, Oral, Daily      Continuous Infusions:   PRN Meds:.  acetaminophen    senna-docusate sodium **AND** polyethylene glycol **AND** bisacodyl **AND** bisacodyl    nitroglycerin    ondansetron    sodium chloride    sodium chloride     Assessment & Plan       Frequent falls    Frequent falls-  Description of episodes of confusion, blank stare, slurred speech, and weakness concerning for TIA vs seizure vs near-syncope vs generalized weakness from UTI or other source. Serum B12 was normal. TSH normal, free T4 slightly low at 0.82. CT of head shows trace subdural hematoma. Echo pending.  Small subdural hematoma - aspirin discontinued.  UTI- start ceftriaxone; awaiting culture   HTN  - hydralazine, amlodipine   Hyperlipidemia- atorvastatin   5.  Trigeminal neuralgia - continue gabapentin, Tegretol.  Holding muscle relaxer.  6. Generalized weakness- PT and OT consulted.      GI profy- PPI   DVT profy- SCD's     I discussed the patients findings and my recommendations with patient.     Plan for disposition: [facility] at d/c pending acceptance     Shanon Thakkar PA-S  Rhonda Grissom MD

## 2023-07-26 NOTE — PLAN OF CARE
Goal Outcome Evaluation:                      No complaint from pt. VSS, Call light within reach. CT results were called about to on call MD.

## 2023-07-26 NOTE — THERAPY EVALUATION
Patient Name: Julianna Myles  : 10/7/1932    MRN: [id number]                              Today's Date: 2023       Admit Date: 2023    Visit Dx: No diagnosis found.  Patient Active Problem List   Diagnosis    Weakness    Acute exacerbation of chronic obstructive pulmonary disease (COPD)    Common peroneal neuropathy    Degeneration of lumbar or lumbosacral intervertebral disc    Lumbar radiculopathy    Spinal stenosis of lumbar region    Intracranial atherosclerosis    Acute ischemic right MCA stroke    Focal seizure    Frequent falls     Past Medical History:   Diagnosis Date    Acute exacerbation of chronic obstructive pulmonary disease (COPD) 2013    Acute ischemic right MCA stroke 2019    Arthritis     Elevated cholesterol     GERD (gastroesophageal reflux disease)      Past Surgical History:   Procedure Laterality Date    BACK SURGERY      DENTAL PROCEDURE      HYSTERECTOMY        General Information       Row Name 23 1218          OT Time and Intention    Document Type evaluation  -DT     Mode of Treatment occupational therapy  -DT       Row Name 23 1218          General Information    Patient Profile Reviewed yes  -DT     Prior Level of Function independent:;ADL's;all household mobility  Ind with dressing, toileting, sponge bathing. Family assists with showering; uses RW in home & w/c during transportation in community. Family assists with IADLs, some cooking/laundry.  -DT     Existing Precautions/Restrictions other (see comments)  left foot drop  -DT     Barriers to Rehab none identified  -DT       Row Name 23 1218          Living Environment    People in Home alone  -DT       Row Name 23 1218          Home Main Entrance    Number of Stairs, Main Entrance two  -DT     Stair Railings, Main Entrance railings safe and in good condition  -DT       Row Name 23 1218          Stairs Within Home, Primary    Stair Railings, Within Home, Primary none  -DT       Row  Name 07/26/23 1218          Cognition    Orientation Status (Cognition) oriented to;person;place;disoriented to;situation;time  -DT       Row Name 07/26/23 1218          Safety Issues, Functional Mobility    Impairments Affecting Function (Mobility) balance;endurance/activity tolerance;strength  -DT               User Key  (r) = Recorded By, (t) = Taken By, (c) = Cosigned By      Initials Name Provider Type    DT Lauren Chou, OT Occupational Therapist                     Mobility/ADL's       Row Name 07/26/23 1241          Bed Mobility    Bed Mobility bed mobility (all) activities  -DT     All Activities, [place] (Bed Mobility) modified independence  -DT     Assistive Device (Bed Mobility) head of bed elevated;bed rails  -DT       Row Name 07/26/23 1241          Transfers    Transfers bed-chair transfer;stand-sit transfer;sit-stand transfer  -DT       Row Name 07/26/23 1241          Bed-Chair Transfer    Bed-Chair [place] (Transfers) contact guard  -DT     Assistive Device (Bed-Chair Transfers) walker, front-wheeled  -DT       Row Name 07/26/23 1241          Sit-Stand Transfer    Sit-Stand [place] (Transfers) contact guard  -DT     Assistive Device (Sit-Stand Transfers) walker, front-wheeled  -DT       Row Name 07/26/23 1241          Stand-Sit Transfer    Stand-Sit [place] (Transfers) contact guard  -DT     Assistive Device (Stand-Sit Transfers) walker, front-wheeled  -DT       Row Name 07/26/23 1241          Activities of Daily Living    BADL Assessment/Intervention lower body dressing  -DT       Row Name 07/26/23 1241          Lower Body Dressing Assessment/Training    [place] Level (Lower Body Dressing) lower body dressing skills;minimum assist (75% patient effort)  -DT     Position (Lower Body Dressing) edge of bed sitting;supported standing  -DT               User Key  (r) = Recorded By, (t) = Taken By, (c) = Cosigned By      Initials Name Provider Type    DT Lauren Chou  SUBHASH Monk Occupational Therapist                   Obj/Interventions       Row Name 07/26/23 1242          Range of Motion Comprehensive    General Range of Motion upper extremity range of motion deficits identified  -DT     Comment, General Range of Motion LUE=WFL, approx 25% limited Right shoulder flex AROM.  -DT       Row Name 07/26/23 1242          Strength Comprehensive (MMT)    General Manual Muscle Testing (MMT) Assessment upper extremity strength deficits identified  -DT     Comment, General Manual Muscle Testing (MMT) Assessment LUE=4/5, RUE shoulder flex= 3-/5, ext 3+/5 & elbow, grasp= 4-/5.  -DT       Row Name 07/26/23 1242          Balance    Balance Assessment sitting static balance;sitting dynamic balance;standing dynamic balance;standing static balance  -DT     Static Sitting Balance independent  -DT     Dynamic Sitting Balance contact guard;standby assist  -DT     Position, Sitting Balance sitting edge of bed  -DT     Static Standing Balance contact guard  -DT     Dynamic Standing Balance contact guard  -DT     Position/Device Used, Standing Balance walker, front-wheeled  -DT               User Key  (r) = Recorded By, (t) = Taken By, (c) = Cosigned By      Initials Name Provider Type    DT Lauren Chou OT Occupational Therapist                   Goals/Plan       Row Name 07/26/23 1245          Transfer Goal 1 (OT)    Activity/Assistive Device (Transfer Goal 1, OT) transfers, all  -DT     [place] Level/Cues Needed (Transfer Goal 1, OT) modified independence  -DT     Time Frame (Transfer Goal 1, OT) 2 weeks  -DT       Row Name 07/26/23 1245          Bathing Goal 1 (OT)    Activity/Device (Bathing Goal 1, OT) bathing skills, all  -DT     [place] Level/Cues Needed (Bathing Goal 1, OT) modified independence  -DT     Time Frame (Bathing Goal 1, OT) 2 weeks  -DT       Row Name 07/26/23 1245          Dressing Goal 1 (OT)    Activity/Device (Dressing Goal 1, OT) dressing skills, all   -DT     [place]/Cues Needed (Dressing Goal 1, OT) modified independence  -DT     Time Frame (Dressing Goal 1, OT) 2 weeks  -DT       Row Name 07/26/23 1245          Toileting Goal 1 (OT)    Activity/Device (Toileting Goal 1, OT) toileting skills, all  -DT     [place] Level/Cues Needed (Toileting Goal 1, OT) modified independence  -DT     Time Frame (Toileting Goal 1, OT) 2 weeks  -DT       Row Name 07/26/23 1245          Therapy Assessment/Plan (OT)    Planned Therapy Interventions (OT) activity tolerance training;BADL retraining;cognitive/visual perception retraining;functional balance retraining;neuromuscular control/coordination retraining;occupation/activity based interventions;patient/caregiver education/training;ROM/therapeutic exercise;strengthening exercise;transfer/mobility retraining  -DT               User Key  (r) = Recorded By, (t) = Taken By, (c) = Cosigned By      Initials Name Provider Type    DT Lauren Chou, OT Occupational Therapist                   Clinical Impression       Row Name 07/26/23 1244          Pain Assessment    Pretreatment Pain Rating 0/10 - no pain  -DT     Posttreatment Pain Rating 0/10 - no pain  -DT       Row Name 07/26/23 1244          Plan of Care Review    Plan of Care Reviewed With patient;daughter  -DT     Outcome Evaluation Pt is a 89 y.o. F adm to [facility] on 07/25/23 with reports of inc weakness, frequent falls at home (7 falls in 1 month) & episodes of slurred/slowed speech & confusion. Pt has a PMH of trigeminal neuralgia, TIAs, hyperlipidemia, left drop foot, COPD, lumbar degen, CVA & partial seizure. CT of head showed possible trace subdural hematoma. Pt also diagnosed with UTI. At baseline, pt lives at home alone in a H with 2 NICK. She uses a RW for amb. She uses a w/c in the community with family providing transportation. She is ind with dressing, toileting, feeding & sponge bathing. Her family helps her when she showers. Pt has a life alert for  home use & family checks on her often. She was receiving  PT prior to adm. Upon eval, pt is A&O X2. Unable to recall month, year or situation. Pt pleasant & cooperative & able to complete bed mobility with mod ind. She req CGA for ADL transfers & min A for donning LB clothing with v.c. for orientation of clothing/shoes. She has generalized dec overall strength & activity tolerance/endurance & due to her hx of having several recent falls & confusion, she would benefit from more intensive short term inpt rehab to prepare her for returning home alone. Will continue to follow for OT tx while in acute pt care.  -DT       Row Name 07/26/23 1244          Therapy Assessment/Plan (OT)    Rehab Potential (OT) good, to achieve stated therapy goals  -DT     Criteria for Skilled Therapeutic Interventions Met (OT) yes;meets criteria;skilled treatment is necessary  -DT     Therapy Frequency (OT) 5 times/wk  -DT     Predicted Duration of Therapy Intervention (OT) until d/c  -DT       Row Name 07/26/23 1244          Therapy Plan Review/Discharge Plan (OT)    Anticipated Discharge Disposition (OT) inpatient rehabilitation facility  -DT       Row Name 07/26/23 1244          Positioning and Restraints    Pre-Treatment Position in bed  -DT     Post Treatment Position chair  -DT     In Chair notified nsg;sitting;call light within reach;encouraged to call for assist;exit alarm on;with family/caregiver  -DT               User Key  (r) = Recorded By, (t) = Taken By, (c) = Cosigned By      Initials Name Provider Type    DT Lauren Chou, OT Occupational Therapist                   Outcome Measures       Row Name 07/26/23 0810          How much help from another person do you currently need...    Turning from your back to your side while in flat bed without using bedrails? 4  -ES     Moving from lying on back to sitting on the side of a flat bed without bedrails? 4  -ES     Moving to and from a bed to a chair (including a  wheelchair)? 4  -ES     Standing up from a chair using your arms (e.g., wheelchair, bedside chair)? 4  -ES     Climbing 3-5 steps with a railing? 4  -ES     To walk in hospital room? 4  -ES     AM-PAC 6 Clicks Score (PT) 24  -ES     Highest level of mobility 8 --> Walked 250 feet or more  -ES               User Key  (r) = Recorded By, (t) = Taken By, (c) = Cosigned By      Initials Name Provider Type    Lili Tracey, RN Registered Nurse                    Occupational Therapy Education       Title: PT OT SLP Therapies (In Progress)       Topic: Occupational Therapy (In Progress)       Point: ADL training (Done)       Description:   Instruct learner(s) on proper safety adaptation and remediation techniques during self care or transfers.   Instruct in proper use of assistive devices.                  Learning Progress Summary             Patient Acceptance, E,TB, VU by DT at 7/26/2023 1246    Comment: role of OT,goals & POC, safety prec in hosp setting   Family Acceptance, E,TB, VU by DT at 7/26/2023 1246    Comment: role of OT,goals & POC, safety prec in hosp setting                         Point: Home exercise program (Not Started)       Description:   Instruct learner(s) on appropriate technique for monitoring, assisting and/or progressing therapeutic exercises/activities.                  Learner Progress:  Not documented in this visit.              Point: Precautions (Done)       Description:   Instruct learner(s) on prescribed precautions during self-care and functional transfers.                  Learning Progress Summary             Patient Acceptance, E,TB, VU by DT at 7/26/2023 1246    Comment: role of OT,goals & POC, safety prec in hosp setting   Family Acceptance, E,TB, VU by DT at 7/26/2023 1246    Comment: role of OT,goals & POC, safety prec in hosp setting                         Point: Body mechanics (Done)       Description:   Instruct learner(s) on proper positioning and spine alignment  during self-care, functional mobility activities and/or exercises.                  Learning Progress Summary             Patient Acceptance, E,TB, VU by DT at 7/26/2023 1246    Comment: role of OT,goals & POC, safety prec in hosp setting   Family Acceptance, E,TB, VU by DT at 7/26/2023 1246    Comment: role of OT,goals & POC, safety prec in hosp setting                                         User Key       Initials Effective Dates Name Provider Type Discipline    DT 07/11/23 -  Lauren Chou, OT Occupational Therapist OT                  OT Recommendation and Plan  Planned Therapy Interventions (OT): activity tolerance training, BADL retraining, cognitive/visual perception retraining, functional balance retraining, neuromuscular control/coordination retraining, occupation/activity based interventions, patient/caregiver education/training, ROM/therapeutic exercise, strengthening exercise, transfer/mobility retraining  Therapy Frequency (OT): 5 times/wk  Plan of Care Review  Plan of Care Reviewed With: patient, daughter  Outcome Evaluation: Pt is a 89 y.o. F adm to [facility] on 07/25/23 with reports of inc weakness, frequent falls at home (7 falls in 1 month) & episodes of slurred/slowed speech & confusion. Pt has a PMH of trigeminal neuralgia, TIAs, hyperlipidemia, left drop foot, COPD, lumbar degen, CVA & partial seizure. CT of head showed possible trace subdural hematoma. Pt also diagnosed with UTI. At baseline, pt lives at home alone in a H with 2 NICK. She uses a RW for amb. She uses a w/c in the community with family providing transportation. She is ind with dressing, toileting, feeding & sponge bathing. Her family helps her when she showers. Pt has a life alert for home use & family checks on her often. She was receiving  PT prior to adm. Upon eval, pt is A&O X2. Unable to recall month, year or situation. Pt pleasant & cooperative & able to complete bed mobility with mod ind. She req CGA for ADL  transfers & min A for donning LB clothing with v.c. for orientation of clothing/shoes. She has generalized dec overall strength & activity tolerance/endurance & due to her hx of having several recent falls & confusion, she would benefit from more intensive short term inpt rehab to prepare her for returning home alone. Will continue to follow for OT tx while in acute pt care.     Time Calculation:         Time Calculation- OT       Row Name 07/26/23 1247             Time Calculation- OT    OT Start Time 1137  -DT      OT Stop Time 1205  -DT      OT Time Calculation (min) 28 min  -DT      OT Received On 07/26/23  -DT      OT - Next Appointment 07/27/23  -DT      OT Goal Re-Cert Due Date 08/09/23  -DT                User Key  (r) = Recorded By, (t) = Taken By, (c) = Cosigned By      Initials Name Provider Type    Lauren Gonzalez, OT Occupational Therapist                           Lauren Chou, OT  7/26/2023

## 2023-07-26 NOTE — CASE MANAGEMENT/SOCIAL WORK
Discharge Planning Assessment  [facility]     Patient Name: Julianna Myles  MRN: [id number]  Today's Date: 7/26/2023    Admit Date: 7/25/2023    Plan: SIR pending acceptance. No pre-cert or PASRR required.   Discharge Needs Assessment       Row Name 07/26/23 1323       Living Environment    People in Home alone    Current Living Arrangements home    Potentially Unsafe Housing Conditions none    Primary Care Provided by self    Provides Primary Care For no one    Family Caregiver if Needed child(álvaro), adult    Quality of Family Relationships helpful;involved;supportive    Able to Return to Prior Arrangements yes       Resource/Environmental Concerns    Resource/Environmental Concerns none    Transportation Concerns none       Transition Planning    Patient/Family Anticipates Transition to inpatient rehabilitation facility    Patient/Family Anticipated Services at Transition none    Transportation Anticipated family or friend will provide       Discharge Needs Assessment    Readmission Within the Last 30 Days no previous admission in last 30 days    Equipment Currently Used at Home cane, straight;walker, rolling;wheelchair;shower chair;commode    Concerns to be Addressed discharge planning    Anticipated Changes Related to Illness none    Equipment Needed After Discharge none    Discharge Facility/Level of Care Needs rehabilitation facility    Provided Post Acute Provider List? Yes    Post Acute Provider List Inpatient Rehab    Delivered To Support Person    Method of Delivery In person                   Discharge Plan       Row Name 07/26/23 1324       Plan    Plan SIR pending acceptance. No pre-cert or PASRR required.    Patient/Family in Agreement with Plan yes    Plan Comments Patient lives at home alone. Patient does not drive, daughter will transport at discharge. Patient performs ADL with moderate assistance from adult children. PCP and pharmacy confirmed. Denies financial assistance needs for medication and/or  food. Per PT/OT, recommending IPR. Family would like patient to go to [facility]. CM sent referral, message sent to liaisons Georgina and Iza, pending response. No pre-cert or PASRR required.                Demographic Summary       Row Name 07/26/23 1323       General Information    Admission Type inpatient    Arrived From emergency department    Required Notices Provided Important Message from Medicare    Referral Source admission list    Reason for Consult discharge planning    Preferred Language English                   Functional Status       Row Name 07/26/23 1323       Functional Status    Usual Activity Tolerance good    Current Activity Tolerance good       Functional Status, IADL    Medications assistive person    Meal Preparation assistive person    Housekeeping assistive person    Laundry assistive person    Shopping assistive person             Met with patient in room.    Maintained distance greater than six feet and spent less than 15 minutes in the room.    Celine Barnard RN, BSN  3A/2A   [address] [address]  Phone: [phone]  Fax: [phone]

## 2023-07-26 NOTE — CONSULTS
Visited with patient at bedside.    Patient reports she has been falling and desires rehab to get strength up. Hopes to be able to go back to her previous living situation.    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    Reports no current concerns or needs.    General follow up.    adalid Hurley

## 2023-07-26 NOTE — PLAN OF CARE
Goal Outcome Evaluation:     Pt is a 89 y/o female who presents to [facility] from home, alone with several falls. Dght reports 7 falls in the past few weeks. Pt reports frequent R knee buckling when falling. She utilizes a RW within her home and a transport wheelchair for community distances. Pt has a h/o L foot drop and had brace within room. She presents with decreased activity tolerance, decreased BLE strength (R with more limitations than L), and impaired gait compensating with LLE increased hip/knee flexion, step to gait, d/t decreased L DF. Pt with soft AFO though AFO does not provide as much DF support as it should and pt would benefit from an AFO with more support. Pt ambulates x40ft w/ CGA for safety, no LOB or knee buckling noted. Will plan to complete falls risk assessment at next session, though pt is at a high risk for falls due to h/o L drop foot, decreased activity tolerance, high number of recent falls at home, and decreased BLE strength. Pt would benefit from acute inpatient rehab to address the above deficits and improve her level of independence to be able to return home, alone, safely.

## 2023-07-26 NOTE — CASE MANAGEMENT/SOCIAL WORK
Case Management/Social Work    Patient Name:  Julianna Myles  YOB: 1932  MRN: [id number]  Admit Date:  7/25/2023 07/26/23 1324   PASRR   PASRR Status Under clinical review     Electronically signed by:  FRANCK Maier  07/26/23 13:25 EDT

## 2023-07-26 NOTE — PLAN OF CARE
Goal Outcome Evaluation:                 Patient had an echo this morning, pending results. Worked with PT.

## 2023-07-26 NOTE — THERAPY EVALUATION
Patient Name: Julianna Myles  : 10/7/1932    MRN: [id number]                              Today's Date: 2023       Admit Date: 2023    Visit Dx: No diagnosis found.  Patient Active Problem List   Diagnosis    Weakness    Acute exacerbation of chronic obstructive pulmonary disease (COPD)    Common peroneal neuropathy    Degeneration of lumbar or lumbosacral intervertebral disc    Lumbar radiculopathy    Spinal stenosis of lumbar region    Intracranial atherosclerosis    Acute ischemic right MCA stroke    Focal seizure    Frequent falls     Past Medical History:   Diagnosis Date    Acute exacerbation of chronic obstructive pulmonary disease (COPD) 2013    Acute ischemic right MCA stroke 2019    Arthritis     Elevated cholesterol     GERD (gastroesophageal reflux disease)      Past Surgical History:   Procedure Laterality Date    BACK SURGERY      DENTAL PROCEDURE      HYSTERECTOMY        General Information       Row Name 23 1234          Physical Therapy Time and Intention    Document Type evaluation  -     Mode of Treatment physical therapy  -       Row Name 23 1234          General Information    Prior Level of Function independent:;gait;transfer;bed mobility;all household mobility  family provides transportation; family reports multiple falls at home even in their presence. Has L AFO for foot drop  -     Existing Precautions/Restrictions fall  -       Row Name 23 1234          Living Environment    People in Home alone;other (see comments)  family checks on her frequently though pt still has a h/o high number of falls.  -       Row Name 23 1234          Home Main Entrance    Number of Stairs, Main Entrance three  -     Stair Railings, Main Entrance railings safe and in good condition;railings on both sides of stairs  -       Row Name 23 1234          Stairs Within Home, Primary    Number of Stairs, Within Home, Primary none  -       Row Name  07/26/23 1234          Cognition    Orientation Status (Cognition) oriented to;person;place;disoriented to;situation;time;verbal cues/prompts needed for orientation  -       Row Name 07/26/23 1234          Safety Issues, Functional Mobility    Impairments Affecting Function (Mobility) balance;endurance/activity tolerance;strength  -               User Key  (r) = Recorded By, (t) = Taken By, (c) = Cosigned By      Initials Name Provider Type    Cheli Cordero, PT Physical Therapist                   Mobility       Row Name 07/26/23 1239          Bed Mobility    Bed Mobility supine-sit  -KARI     Supine-Sit [place] (Bed Mobility) modified independence  -KARI     Assistive Device (Bed Mobility) head of bed elevated;bed rails  -KARI     Comment, (Bed Mobility) HOB max elevated  -KARI       Row Name 07/26/23 1239          Sit-Stand Transfer    Sit-Stand [place] (Transfers) contact guard  -KARI     Assistive Device (Sit-Stand Transfers) walker, front-wheeled  -KARI       Row Name 07/26/23 1239          Gait/Stairs (Locomotion)    [place] Level (Gait) contact guard  -KARI     Assistive Device (Gait) walker, front-wheeled  -     Distance in Feet (Gait) 40ft; increased hip and knee flexion on the L d/t foot drop, step to gait, some instability with turns keeping walker at an unsafe close distance to her placing her at an increased risk for posteiror LOB  -KARI     Deviations/Abnormal Patterns (Gait) gait speed decreased  -KARI     Bilateral Gait Deviations forward flexed posture  -KARI     Left Sided Gait Deviations heel strike decreased;foot drop/toe drag  -               User Key  (r) = Recorded By, (t) = Taken By, (c) = Cosigned By      Initials Name Provider Type    Cheli Cordero, PT Physical Therapist                   Obj/Interventions       Row Name 07/26/23 1258          Range of Motion Comprehensive    General Range of Motion bilateral lower extremity ROM WFL  -       Row Name 07/26/23 1250           Strength Comprehensive (MMT)    General Manual Muscle Testing (MMT) Assessment lower extremity strength deficits identified  -     Comment, General Manual Muscle Testing (MMT) Assessment RLE knee grossly 3/5, R ankle DF 3+/5, LLE knee grossly 3+/5, L ankle DF 0/5  -[facility] Name 07/26/23 1258          Balance    Balance Assessment sitting static balance;sitting dynamic balance;standing static balance;standing dynamic balance  -     Static Sitting Balance independent  -     Dynamic Sitting Balance standby assist;contact guard  -     Position, Sitting Balance sitting edge of bed  -     Static Standing Balance contact guard  -     Dynamic Standing Balance contact guard  -     Position/Device Used, Standing Balance walker, rolling  -               User Key  (r) = Recorded By, (t) = Taken By, (c) = Cosigned By      Initials Name Provider Type    Cheli Cordero, PT Physical Therapist                   Goals/Plan       Row Name 07/26/23 1300          Bed Mobility Goal 1 (PT)    Activity/Assistive Device (Bed Mobility Goal 1, PT) bed mobility activities, all  -     [place] Level/Cues Needed (Bed Mobility Goal 1, PT) independent  -KARI     Time Frame (Bed Mobility Goal 1, PT) long term goal (LTG);2 weeks  -KARI       Row Name 07/26/23 1300          Transfer Goal 1 (PT)    Activity/Assistive Device (Transfer Goal 1, PT) sit-to-stand/stand-to-sit;bed-to-chair/chair-to-bed  -     [place] Level/Cues Needed (Transfer Goal 1, PT) modified independence  -KARI     Time Frame (Transfer Goal 1, PT) long term goal (LTG);2 weeks  -[facility] Name 07/26/23 1300          Gait Training Goal 1 (PT)    Activity/Assistive Device (Gait Training Goal 1, PT) gait (walking locomotion)  -     [place] Level (Gait Training Goal 1, PT) modified independence  -     Distance (Gait Training Goal 1, PT) 75ft  -KARI     Time Frame (Gait Training Goal 1, PT) long term goal (LTG);2 weeks  -KARI       Row Name 07/26/23  1300          Therapy Assessment/Plan (PT)    Planned Therapy Interventions (PT) balance training;bed mobility training;gait training;home exercise program;patient/family education;stair training;strengthening;neuromuscular re-education;transfer training  -               User Key  (r) = Recorded By, (t) = Taken By, (c) = Cosigned By      Initials Name Provider Type    Cheli Cordero, PT Physical Therapist                   Clinical Impression       Row Name 07/26/23 1255          Pain    Pretreatment Pain Rating 0/10 - no pain  -KARI     Posttreatment Pain Rating 0/10 - no pain  -       Row Name 07/26/23 1258          Plan of Care Review    Plan of Care Reviewed With patient;daughter  -     Outcome Evaluation Pt is a 89 y/o female who presents to [facility] from home, alone with several falls. Dg reports 7 falls in the past few weeks. Pt reports frequent R knee buckling when falling. She utilizes a RW within her home and a transport wheelchair for community distances. Pt has a h/o L foot drop and had brace within room. She presents with decreased activity tolerance, decreased BLE strength (R with more limitations than L), and impaired gait compensating with LLE increased hip/knee flexion, step to gait, d/t decreased L DF. Pt with soft AFO though AFO does not provide as much DF support as it should and pt would benefit from an AFO with more support. Pt ambulates x40ft w/ CGA for safety, no LOB or knee buckling noted. Will plan to complete falls risk assessment at next session, though pt is at a high risk for falls due to h/o L drop foot, decreased activity tolerance, high number of recent falls at home, and decreased BLE strength. Pt would benefit from acute inpatient rehab to address the above deficits and improve her level of independence to be able to return home, alone, safely.  -       Row Name 07/26/23 1259          Therapy Assessment/Plan (PT)    Criteria for Skilled Interventions Met (PT) yes;meets  criteria  -KARI     Therapy Frequency (PT) 5 times/wk  -KARI     Predicted Duration of Therapy Intervention (PT) until d/c  -KARI       Row Name 07/26/23 1259          Vital Signs    O2 Delivery Pre Treatment room air  -KARI     O2 Delivery Intra Treatment room air  -KARI     O2 Delivery Post Treatment room air  -KARI       Row Name 07/26/23 1259          Positioning and Restraints    Pre-Treatment Position in bed  -KARI     Post Treatment Position chair  -KARI     In Chair notified nsg;call light within reach;encouraged to call for assist;exit alarm on;sitting;with family/caregiver  -KARI               User Key  (r) = Recorded By, (t) = Taken By, (c) = Cosigned By      Initials Name Provider Type    Cheli Cordero, PT Physical Therapist                   Outcome Measures       Row Name 07/26/23 1300 07/26/23 0810       How much help from another person do you currently need...    Turning from your back to your side while in flat bed without using bedrails? 3  -KARI 4  -ES    Moving from lying on back to sitting on the side of a flat bed without bedrails? 3  -KARI 4  -ES    Moving to and from a bed to a chair (including a wheelchair)? 3  -KARI 4  -ES    Standing up from a chair using your arms (e.g., wheelchair, bedside chair)? 3  -KARI 4  -ES    Climbing 3-5 steps with a railing? 3  -KARI 4  -ES    To walk in hospital room? 3  -KARI 4  -ES    AM-PAC 6 Clicks Score (PT) 18  -KARI 24  -ES    Highest level of mobility 6 --> Walked 10 steps or more  - 8 --> Walked 250 feet or more  -ES      Row Name 07/26/23 1300          Functional Assessment    Outcome Measure Options AM-PAC 6 Clicks Basic Mobility (PT)  -               User Key  (r) = Recorded By, (t) = Taken By, (c) = Cosigned By      Initials Name Provider Type    Chlei Cordero, MARC Physical Therapist    Lili Tracey RN Registered Nurse                                 Physical Therapy Education       Title: PT OT SLP Therapies (In Progress)       Topic: Physical Therapy (In  Progress)       Point: Mobility training (In Progress)       Learning Progress Summary             Patient Acceptance, E,TB, NR by  at 7/26/2023 1301                         Point: Home exercise program (Not Started)       Learner Progress:  Not documented in this visit.              Point: Body mechanics (In Progress)       Learning Progress Summary             Patient Acceptance, E,TB, NR by KARI at 7/26/2023 1301                         Point: Precautions (In Progress)       Learning Progress Summary             Patient Acceptance, E,TB, NR by  at 7/26/2023 1301                                         User Key       Initials Effective Dates Name Provider Type Discipline    KARI 08/23/21 -  Cheli Dockery, PT Physical Therapist PT                  PT Recommendation and Plan  Planned Therapy Interventions (PT): balance training, bed mobility training, gait training, home exercise program, patient/family education, stair training, strengthening, neuromuscular re-education, transfer training  Plan of Care Reviewed With: patient, daughter  Outcome Evaluation: Pt is a 89 y/o female who presents to [facility] from home, alone with several falls. [facility] reports 7 falls in the past few weeks. Pt reports frequent R knee buckling when falling. She utilizes a RW within her home and a transport wheelchair for community distances. Pt has a h/o L foot drop and had brace within room. She presents with decreased activity tolerance, decreased BLE strength (R with more limitations than L), and impaired gait compensating with LLE increased hip/knee flexion, step to gait, d/t decreased L DF. Pt with soft AFO though AFO does not provide as much DF support as it should and pt would benefit from an AFO with more support. Pt ambulates x40ft w/ CGA for safety, no LOB or knee buckling noted. Will plan to complete falls risk assessment at next session, though pt is at a high risk for falls due to h/o L drop foot, decreased activity tolerance, high  number of recent falls at home, and decreased BLE strength. Pt would benefit from acute inpatient rehab to address the above deficits and improve her level of independence to be able to return home, alone, safely.     Time Calculation:         PT Charges       Row Name 07/26/23 1302             Time Calculation    Start Time 1137  -KARI      Stop Time 1205  -KARI      Time Calculation (min) 28 min  -KARI      PT Received On 07/26/23  -KARI      PT - Next Appointment 07/27/23  -KARI      PT Goal Re-Cert Due Date 08/09/23  -KARI                User Key  (r) = Recorded By, (t) = Taken By, (c) = Cosigned By      Initials Name Provider Type    Cheli Cordero, PT Physical Therapist                  Therapy Charges for Today       Code Description Service Date Service Provider Modifiers Qty    18228859830 HC PT EVAL MOD COMPLEXITY 4 7/26/2023 Cheli Dockery, PT GP 1            PT G-Codes  Outcome Measure Options: AM-PAC 6 Clicks Basic Mobility (PT)  AM-PAC 6 Clicks Score (PT): 18  PT Discharge Summary  Anticipated Discharge Disposition (PT): inpatient rehabilitation facility    Cheli Dockery, PT  7/26/2023

## 2023-07-26 NOTE — DISCHARGE PLACEMENT REQUEST
"Minnie Myles (89 y.o. Female)       Date of Birth   10/07/1932    Social Security Number       Address   [address] [place] [facility]    Home Phone   [phone]    MRN   [id number]       [facility]    Marital Status                               Admission Date   7/25/23    Admission Type   Elective    Admitting Provider   Rhonda Grissom MD    Attending Provider   Rhonda Grissom MD    Department, Room/Bed   [facility] 3A MEDICAL INPATIENT, 307/1       Discharge Date       Discharge Disposition       Discharge Destination                                 Attending Provider: Rhonda Grissom MD    Allergies: Banana, Green Beans    Isolation: None   Infection: None   Code Status: CPR    Ht: 152.4 cm (60\")   Wt: 53.1 kg (117 lb)    Admission Cmt: None   Principal Problem: Frequent falls [R29.6]                   Active Insurance as of 7/25/2023       Primary Coverage       Payor Plan Insurance Group Employer/Plan Group    MEDICARE MEDICARE A & B        Payor Plan Address Payor Plan Phone Number Payor Plan Fax Number Effective Dates    [address] [phone]  10/1/1997 - None Entered    [facility] [id number]         Subscriber Name Subscriber Birth Date Member ID       MINNIE MYLES 10/7/1932 [id number]               Secondary Coverage       Payor Plan Insurance Group Employer/Plan Group    [employer]                  Payor Plan Address Payor Plan Phone Number Payor Plan Fax Number Effective Dates    [address]   1/1/2015 - None Entered    [facility] [address]         Subscriber Name Subscriber Birth Date Member ID       MINNIE MYLES 10/7/1932 [id number]                     Emergency Contacts        (Rel.) Home Phone Work Phone Mobile Phone    MARIETANIAE (Daughter) [phone] -- [phone]    Gale Martinez (Daughter) [phone] -- --    SharMarcial (Son) [phone] -- --            "

## 2023-07-27 VITALS
TEMPERATURE: 98 F | HEIGHT: 60 IN | HEART RATE: 68 BPM | DIASTOLIC BLOOD PRESSURE: 72 MMHG | BODY MASS INDEX: 22.33 KG/M2 | SYSTOLIC BLOOD PRESSURE: 129 MMHG | WEIGHT: 113.76 LBS | OXYGEN SATURATION: 94 % | RESPIRATION RATE: 16 BRPM

## 2023-07-27 PROBLEM — I34.0 MODERATE MITRAL REGURGITATION: Status: ACTIVE | Noted: 2023-07-27

## 2023-07-27 PROBLEM — G50.0 TRIGEMINAL NEURALGIA: Status: ACTIVE | Noted: 2023-07-27

## 2023-07-27 PROBLEM — N39.0 UTI (URINARY TRACT INFECTION), BACTERIAL: Status: ACTIVE | Noted: 2023-07-27

## 2023-07-27 PROBLEM — A49.9 UTI (URINARY TRACT INFECTION), BACTERIAL: Status: ACTIVE | Noted: 2023-07-27

## 2023-07-27 LAB
BASOPHILS # BLD AUTO: 0.1 10*3/MM3 (ref 0–0.2)
BASOPHILS NFR BLD AUTO: 0.9 % (ref 0–1.5)
DEPRECATED RDW RBC AUTO: 47.7 FL (ref 37–54)
EOSINOPHIL # BLD AUTO: 0.2 10*3/MM3 (ref 0–0.4)
EOSINOPHIL NFR BLD AUTO: 3.3 % (ref 0.3–6.2)
ERYTHROCYTE [DISTWIDTH] IN BLOOD BY AUTOMATED COUNT: 13.4 % (ref 12.3–15.4)
HCT VFR BLD AUTO: 39.6 % (ref 34–46.6)
HGB BLD-MCNC: 12.9 G/DL (ref 12–15.9)
LYMPHOCYTES # BLD AUTO: 1.6 10*3/MM3 (ref 0.7–3.1)
LYMPHOCYTES NFR BLD AUTO: 28.5 % (ref 19.6–45.3)
MCH RBC QN AUTO: 33.4 PG (ref 26.6–33)
MCHC RBC AUTO-ENTMCNC: 32.5 G/DL (ref 31.5–35.7)
MCV RBC AUTO: 102.6 FL (ref 79–97)
MONOCYTES # BLD AUTO: 0.7 10*3/MM3 (ref 0.1–0.9)
MONOCYTES NFR BLD AUTO: 11.6 % (ref 5–12)
NEUTROPHILS NFR BLD AUTO: 3.2 10*3/MM3 (ref 1.7–7)
NEUTROPHILS NFR BLD AUTO: 55.7 % (ref 42.7–76)
NRBC BLD AUTO-RTO: 0 /100 WBC (ref 0–0.2)
PLATELET # BLD AUTO: 210 10*3/MM3 (ref 140–450)
PMV BLD AUTO: 8.5 FL (ref 6–12)
RBC # BLD AUTO: 3.86 10*6/MM3 (ref 3.77–5.28)
WBC NRBC COR # BLD: 5.8 10*3/MM3 (ref 3.4–10.8)

## 2023-07-27 PROCEDURE — 85025 COMPLETE CBC W/AUTO DIFF WBC: CPT | Performed by: INTERNAL MEDICINE

## 2023-07-27 PROCEDURE — 97530 THERAPEUTIC ACTIVITIES: CPT

## 2023-07-27 PROCEDURE — 97535 SELF CARE MNGMENT TRAINING: CPT

## 2023-07-27 RX ORDER — CEFDINIR 300 MG/1
300 CAPSULE ORAL 2 TIMES DAILY
Qty: 10 CAPSULE | Refills: 0
Start: 2023-07-27 | End: 2023-08-01

## 2023-07-27 RX ORDER — ACETAMINOPHEN 325 MG/1
650 TABLET ORAL EVERY 4 HOURS PRN
Start: 2023-07-27

## 2023-07-27 RX ORDER — GABAPENTIN 400 MG/1
400 CAPSULE ORAL
Start: 2023-07-27

## 2023-07-27 RX ADMIN — CARBAMAZEPINE 200 MG: 200 TABLET ORAL at 08:35

## 2023-07-27 RX ADMIN — AMLODIPINE BESYLATE 10 MG: 5 TABLET ORAL at 08:34

## 2023-07-27 RX ADMIN — Medication 10 ML: at 08:35

## 2023-07-27 RX ADMIN — CARBAMAZEPINE 200 MG: 200 TABLET ORAL at 11:53

## 2023-07-27 RX ADMIN — CYANOCOBALAMIN TAB 1000 MCG 1000 MCG: 1000 TAB at 08:35

## 2023-07-27 RX ADMIN — HYDRALAZINE HYDROCHLORIDE 100 MG: 25 TABLET, FILM COATED ORAL at 08:34

## 2023-07-27 RX ADMIN — PANTOPRAZOLE SODIUM 40 MG: 40 TABLET, DELAYED RELEASE ORAL at 06:06

## 2023-07-27 NOTE — PLAN OF CARE
Goal Outcome Evaluation:                      No complaints from pt all night. IV abx given. VSS, call light within reach.

## 2023-07-27 NOTE — PLAN OF CARE
Assessment: Julianna Myles presents with ADL impairments affecting function including balance and endurance / activity tolerance. Pt with decreased safety awareness and requires cues for safety. Demonstrated functioning below baseline abilities indicate the need for continued skilled intervention while inpatient. Tolerating session today without incident. Will continue to follow and progress as tolerated.     Plan/Recommendations:   High Intensity Therapy recommended post-acute care. This is recommended as therapy feels the patient would require 5-6 days per week, 2-3 hours per day. At this time, inpatient rehabilitation (acute rehab) would be the first choice and SNF would be second.. Pt requires no DME at discharge.

## 2023-07-27 NOTE — CASE MANAGEMENT/SOCIAL WORK
Continued Stay Note  [facility]     Patient Name: Julianna Myles  MRN: [id number]  Today's Date: 7/27/2023    Admit Date: 7/25/2023    Plan: [facility] accepted. No pre-cert or PASRR required.   Discharge Plan       Row Name 07/27/23 1353       Plan    Plan [facility] accepted. No pre-cert or PASRR required.    Plan Comments Per [facility] accepted. No pre-cert or PASRR required. CM updated patient and daughter at bedside. CM updated MD, NP, and floor RN.                  Met with patient in room.     Maintained distance greater than six feet and spent less than 15 minutes in the room.    Celine Barnard, RN, BSN  3A/2A   [address] [address]  Phone: [phone]  Fax: [phone]

## 2023-07-27 NOTE — DISCHARGE SUMMARY
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          Hospital Course  Patient is a 89 y.o. female with h/o prior cva, trigeminal neuralgia presented with frequent falls, episodes of generalized weakness and some confusion.  UA was consistent with UTI but unfortunately no culture was completed.  She improved with ceftriaxone and returned to her baseline mental status.  She had no episodes of weakness or altered mental status after starting treatment for UTI.  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  Echo showed moderate mitral regurgitation but she is asymptomatic.  She is hemodynamically stable, tolerating a regular diet and is feeling well.  She will go to [facility] for skilled rehab before returning to the care of her family.    Procedures Performed         Consults:   Consults       No orders found from 6/26/2023 to 7/26/2023.            Pertinent Test Results:    Lab Results (most recent)       Procedure Component Value Units Date/Time    CBC & Differential [[id number]]  (Abnormal) Collected: 07/27/23 0246    Specimen: Blood from Arm, Left Updated: 07/27/23 0346    Narrative:      The following orders were created for panel order CBC & Differential.  Procedure                               Abnormality         Status                     ---------                                -----------         ------                     CBC Auto Differential[[id number]]        Abnormal            Final result                 Please view results for these tests on the individual orders.    CBC Auto Differential [[id number]]  (Abnormal) Collected: 07/27/23 0246    Specimen: Blood from Arm, Left Updated: 07/27/23 0346     WBC 5.80 10*3/mm3      RBC 3.86 10*6/mm3      Hemoglobin 12.9 g/dL      Hematocrit 39.6 %      .6 fL      MCH 33.4 pg      MCHC 32.5 g/dL      RDW 13.4 %      RDW-SD 47.7 fl      MPV 8.5 fL      Platelets 210 10*3/mm3      Neutrophil % 55.7 %      Lymphocyte % 28.5 %      Monocyte % 11.6 %      Eosinophil % 3.3 %      Basophil % 0.9 %      Neutrophils, Absolute 3.20 10*3/mm3      Lymphocytes, Absolute 1.60 10*3/mm3      Monocytes, Absolute 0.70 10*3/mm3      Eosinophils, Absolute 0.20 10*3/mm3      Basophils, Absolute 0.10 10*3/mm3      nRBC 0.0 /100 WBC     Vitamin B12 [[id number]]  (Normal) Collected: 07/26/23 0113    Specimen: Blood Updated: 07/26/23 1200     Vitamin B-12 557 pg/mL     Narrative:      Results may be falsely increased if patient taking Biotin.      CBC & Differential [[id number]]  (Abnormal) Collected: 07/26/23 0113    Specimen: Blood Updated: 07/26/23 0350    Narrative:      The following orders were created for panel order CBC & Differential.  Procedure                               Abnormality         Status                     ---------                               -----------         ------                     CBC Auto Differential[[id number]]        Abnormal            Final result                 Please view results for these tests on the individual orders.    CBC Auto Differential [[id number]]  (Abnormal) Collected: 07/26/23 0113    Specimen: Blood Updated: 07/26/23 0350     WBC 5.10 10*3/mm3      RBC 3.60 10*6/mm3      Hemoglobin 11.9 g/dL      Hematocrit 36.5 %      .4 fL      MCH 33.0 pg      MCHC 32.6 g/dL      RDW 13.3 %      RDW-SD 49.0 fl       MPV 9.0 fL      Platelets 188 10*3/mm3      Neutrophil % 59.3 %      Lymphocyte % 24.6 %      Monocyte % 11.1 %      Eosinophil % 4.2 %      Basophil % 0.8 %      Neutrophils, Absolute 3.00 10*3/mm3      Lymphocytes, Absolute 1.30 10*3/mm3      Monocytes, Absolute 0.60 10*3/mm3      Eosinophils, Absolute 0.20 10*3/mm3      Basophils, Absolute 0.00 10*3/mm3      nRBC 0.2 /100 WBC     Urinalysis, Microscopic Only - Urine, Clean Catch [[id number]]  (Abnormal) Collected: 07/25/23 2020    Specimen: Urine, Clean Catch Updated: 07/25/23 2055     RBC, UA 0-2 /HPF      WBC, UA 0-2 /HPF      Comment: Urine culture not indicated.        Bacteria, UA 4+ /HPF      Squamous Epithelial Cells, UA 0-2 /HPF      Hyaline Casts, UA None Seen /LPF      Methodology Manual Light Microscopy    Urinalysis With Culture If Indicated - Urine, Clean Catch [[id number]]  (Abnormal) Collected: 07/25/23 2020    Specimen: Urine, Clean Catch Updated: 07/25/23 2042     Color, UA Dark Yellow     Comment: Result checked          Appearance, UA Clear     pH, UA 7.0     Specific Gravity, UA 1.016     Glucose, UA Negative     Ketones, UA Negative     Bilirubin, UA Negative     Blood, UA Negative     Protein, UA Negative     Leuk Esterase, UA Negative     Nitrite, UA Positive     Urobilinogen, UA 1.0 E.U./dL    Narrative:      In absence of clinical symptoms, the presence of pyuria, bacteria, and/or nitrites on the urinalysis result does not correlate with infection.    T4, Free [[id number]]  (Abnormal) Collected: 07/25/23 1548    Specimen: Blood Updated: 07/25/23 2022     Free T4 0.82 ng/dL     Narrative:      Results may be falsely increased if patient taking Biotin.      TSH [[id number]]  (Normal) Collected: 07/25/23 1548    Specimen: Blood Updated: 07/25/23 1631     TSH 0.460 uIU/mL     BNP [[id number]]  (Normal) Collected: 07/25/23 1548    Specimen: Blood Updated: 07/25/23 1631     proBNP 271.1 pg/mL     Narrative:      Among patients with dyspnea,  NT-proBNP is highly sensitive for the detection of acute congestive heart failure. In addition NT-proBNP of <300 pg/ml effectively rules out acute congestive heart failure with 99% negative predictive value.    Results may be falsely decreased if patient taking Biotin.      Comprehensive Metabolic Panel [[id number]]  (Abnormal) Collected: 07/25/23 1548    Specimen: Blood Updated: 07/25/23 1626     Glucose 129 mg/dL      BUN 17 mg/dL      Creatinine 0.61 mg/dL      Sodium 142 mmol/L      Potassium 4.5 mmol/L      Chloride 102 mmol/L      CO2 30.0 mmol/L      Calcium 8.9 mg/dL      Total Protein 6.8 g/dL      Albumin 4.3 g/dL      ALT (SGPT) 17 U/L      AST (SGOT) 25 U/L      Alkaline Phosphatase 110 U/L      Total Bilirubin 0.2 mg/dL      Globulin 2.5 gm/dL      A/G Ratio 1.7 g/dL      BUN/Creatinine Ratio 27.9     Anion Gap 10.0 mmol/L      eGFR 85.1 mL/min/1.73     Narrative:      GFR Normal >60  Chronic Kidney Disease <60  Kidney Failure <15    The GFR formula is only valid for adults with stable renal function between ages 18 and 70.             Results for orders placed during the hospital encounter of 07/25/23    Adult Transthoracic Echo Complete W/ Cont if Necessary Per Protocol    Interpretation Summary    Left ventricular ejection fraction appears to be 56 - 60%.    Moderate mitral valve regurgitation is present.              Condition on Discharge:  Improved, stable    Vital Signs  Temp:  [97.7 °F (36.5 °C)-98.7 °F (37.1 °C)] 98 °F (36.7 °C)  Heart Rate:  [61-68] 68  Resp:  [13-20] 16  BP: (129-148)/(59-73) 129/72    Physical Exam:     General Appearance:    Alert, cooperative, in no acute distress   Head:    Normocephalic, without obvious abnormality, atraumatic   Eyes:            Lids and lashes normal, conjunctivae and sclerae normal, no   icterus, no pallor, corneas clear, PERRLA   Ears:    Ears appear intact with no abnormalities noted   Throat:   No oral lesions, no thrush, oral mucosa moist   Neck:    No adenopathy, supple, trachea midline, no thyromegaly, no   carotid bruit, no JVD   Lungs:     Clear to auscultation,respirations regular, even and                  unlabored    Heart:    Regular rhythm and normal rate, normal S1 and S2, no            murmur, no gallop, no rub, no click   Chest Wall:    No abnormalities observed   Abdomen:     Normal bowel sounds, no masses, no organomegaly, soft        non-tender, non-distended, no guarding, no rebound                tenderness   Extremities:   Moves all extremities well, no edema, no cyanosis, no             redness   Pulses:   Pulses palpable and equal bilaterally   Skin:   No bleeding, bruising or rash   Lymph nodes:   No palpable adenopathy   Neurologic:   Cranial nerves 2 - 12 grossly intact, sensation intact, DTR       present and equal bilaterally       Discharge Disposition  Rehab Facility or Unit (DC - External)    Discharge Medications     Discharge Medications        New Medications        Instructions Start Date   acetaminophen 325 MG tablet  Commonly known as: TYLENOL   650 mg, Oral, Every 4 Hours PRN      cefdinir 300 MG capsule  Commonly known as: OMNICEF   300 mg, Oral, 2 Times Daily             Changes to Medications        Instructions Start Date   gabapentin 400 MG capsule  Commonly known as: NEURONTIN  What changed:   medication strength  how much to take  when to take this  Another medication with the same name was removed. Continue taking this medication, and follow the directions you see here.   400 mg, Oral, Daily With Dinner             Continue These Medications        Instructions Start Date   amLODIPine 10 MG tablet  Commonly known as: NORVASC   10 mg, Oral, Daily      aspirin 81 MG chewable tablet   81 mg, Oral, Daily      atorvastatin 40 MG tablet  Commonly known as: LIPITOR   40 mg, Oral, Nightly      carBAMazepine 200 MG tablet  Commonly known as: TEGretol   200 mg, Oral, 4 Times Daily      Combigan 0.2-0.5 % ophthalmic  solution  Generic drug: brimonidine-timolol   Nightly      cyanocobalamin 1000 MCG tablet  Commonly known as: VITAMIN B-12   1,000 mcg, Oral, Daily      hydrALAZINE 100 MG tablet  Commonly known as: APRESOLINE   100 mg, Oral, 2 Times Daily      KLOR-CON 20 MEQ CR tablet  Generic drug: potassium chloride   20 mEq, Oral, Daily, Once a day with dinner       omeprazole 20 MG capsule  Commonly known as: priLOSEC   20 mg, Oral, Daily      vitamin D3 125 MCG (5000 UT) capsule capsule   5,000 Units, Oral, Daily             Stop These Medications      zonisamide 100 MG capsule  Commonly known as: ZONEGRAN              Discharge Diet:   Diet Instructions       Diet: Regular/House Diet; Regular Texture (IDDSI 7); Thin (IDDSI 0)      Discharge Diet: Regular/House Diet    Texture: Regular Texture (IDDSI 7)    Fluid Consistency: Thin (IDDSI 0)            Activity at Discharge:   Activity Instructions       Up WIth Assist              Follow-up Appointments  No future appointments.  Additional Instructions for the Follow-ups that You Need to Schedule       Discharge Follow-up with PCP   As directed       Currently Documented PCP:    Cyndy Rain PA    PCP Phone Number:    [phone]     Follow Up Details: After release from rehab                Test Results Pending at Discharge       Rhonda Grissom MD  07/27/23  14:22 EDT    Time: Discharge 25 min

## 2023-07-27 NOTE — THERAPY TREATMENT NOTE
Subjective: Pt agreeable to therapeutic plan of care.  Cognition: oriented to Person, Place, and Situation, safety/judgement: fair, and awareness of deficits: fair awareness of deficits    Objective:     Bed Mobility: N/A or Not attempted. Pt in chair on arrival   Functional Transfers: CGA with RW and cues for hand placement      Balance: supported, with UE support, and standing Min-A  Functional Ambulation: CGA and with rolling walker household distance     Vitals: WNL    Pain: 0 VAS  Location:   Interventions for pain: N/A  Education: Provided education on the importance of mobility in the acute care setting, ADL training, Transfer Training, and Energy conservation strategies      Assessment: Julianna Myles presents with ADL impairments affecting function including balance and endurance / activity tolerance. Pt with decreased safety awareness and requires cues for safety. Demonstrated functioning below baseline abilities indicate the need for continued skilled intervention while inpatient. Tolerating session today without incident. Will continue to follow and progress as tolerated.     Plan/Recommendations:   High Intensity Therapy recommended post-acute care. This is recommended as therapy feels the patient would require 5-6 days per week, 2-3 hours per day. At this time, inpatient rehabilitation (acute rehab) would be the first choice and SNF would be second.. Pt requires no DME at discharge.     Pt desires Inpatient Rehabilitation placement at discharge. Pt cooperative; agreeable to therapeutic recommendations and plan of care.     Modified Lily: N/A = No pre-op stroke/TIA    Post-Tx Position: Up in Chair, Alarms activated, and Call light and personal items within reach  PPE: gloves

## 2023-07-27 NOTE — PLAN OF CARE
Goal Outcome Evaluation:                 Patient has discharge orders. Discharging to [facility]. Called and gave report to nurse taking patient at [facility]. Discharge instructions provided. IV removed.

## 2023-07-27 NOTE — CASE MANAGEMENT/SOCIAL WORK
Case Management Discharge Note      Final Note: [facility]    Provided Post Acute Provider List?: Yes  Post Acute Provider List: Inpatient Rehab  Delivered To: Support Person  Method of Delivery: In person    Selected Continued Care - Admitted Since 7/25/2023       Destination Coordination complete.      Service Provider Selected Services Address Phone Fax Patient Preferred    [facility] [address] [place] [address] [phone] --             Transportation Services  Private: Car    Final Discharge Disposition Code: 62 - inpatient rehab facility